# Patient Record
Sex: FEMALE | Race: WHITE | Employment: PART TIME | URBAN - METROPOLITAN AREA
[De-identification: names, ages, dates, MRNs, and addresses within clinical notes are randomized per-mention and may not be internally consistent; named-entity substitution may affect disease eponyms.]

---

## 2021-03-24 ENCOUNTER — HOSPITAL ENCOUNTER (EMERGENCY)
Age: 46
Discharge: HOME | End: 2021-03-25
Attending: EMERGENCY MEDICINE
Payer: MEDICAID

## 2021-03-24 DIAGNOSIS — R51.9 NONINTRACTABLE HEADACHE, UNSPECIFIED CHRONICITY PATTERN, UNSPECIFIED HEADACHE TYPE: Primary | ICD-10-CM

## 2021-03-24 DIAGNOSIS — J02.9 SORETHROAT: ICD-10-CM

## 2021-03-24 DIAGNOSIS — R63.5 WEIGHT GAIN: ICD-10-CM

## 2021-03-24 LAB
ALBUMIN SERPL-MCNC: 3.6 G/DL (ref 3.5–5)
ALBUMIN/GLOB SERPL: 1 {RATIO} (ref 1.1–2.2)
ALP SERPL-CCNC: 74 U/L (ref 45–117)
ALT SERPL-CCNC: 22 U/L (ref 12–78)
ANION GAP SERPL CALC-SCNC: 4 MMOL/L (ref 5–15)
AST SERPL-CCNC: 10 U/L (ref 15–37)
BASOPHILS # BLD: 0 K/UL (ref 0–0.1)
BASOPHILS NFR BLD: 0 % (ref 0–1)
BILIRUB SERPL-MCNC: 0.5 MG/DL (ref 0.2–1)
BUN SERPL-MCNC: 7 MG/DL (ref 6–20)
BUN/CREAT SERPL: 11 (ref 12–20)
CALCIUM SERPL-MCNC: 8.6 MG/DL (ref 8.5–10.1)
CHLORIDE SERPL-SCNC: 107 MMOL/L (ref 97–108)
CO2 SERPL-SCNC: 28 MMOL/L (ref 21–32)
COMMENT, HOLDF: NORMAL
CREAT SERPL-MCNC: 0.66 MG/DL (ref 0.55–1.02)
DIFFERENTIAL METHOD BLD: ABNORMAL
EOSINOPHIL # BLD: 0.1 K/UL (ref 0–0.4)
EOSINOPHIL NFR BLD: 2 % (ref 0–7)
ERYTHROCYTE [DISTWIDTH] IN BLOOD BY AUTOMATED COUNT: 15.2 % (ref 11.5–14.5)
GLOBULIN SER CALC-MCNC: 3.7 G/DL (ref 2–4)
GLUCOSE SERPL-MCNC: 83 MG/DL (ref 65–100)
HCT VFR BLD AUTO: 40 % (ref 35–47)
HGB BLD-MCNC: 13.1 G/DL (ref 11.5–16)
IMM GRANULOCYTES # BLD AUTO: 0 K/UL (ref 0–0.04)
IMM GRANULOCYTES NFR BLD AUTO: 0 % (ref 0–0.5)
LYMPHOCYTES # BLD: 2.4 K/UL (ref 0.8–3.5)
LYMPHOCYTES NFR BLD: 35 % (ref 12–49)
MCH RBC QN AUTO: 30.4 PG (ref 26–34)
MCHC RBC AUTO-ENTMCNC: 32.8 G/DL (ref 30–36.5)
MCV RBC AUTO: 92.8 FL (ref 80–99)
MONOCYTES # BLD: 0.6 K/UL (ref 0–1)
MONOCYTES NFR BLD: 8 % (ref 5–13)
NEUTS SEG # BLD: 3.7 K/UL (ref 1.8–8)
NEUTS SEG NFR BLD: 55 % (ref 32–75)
NRBC # BLD: 0 K/UL (ref 0–0.01)
NRBC BLD-RTO: 0 PER 100 WBC
PLATELET # BLD AUTO: 355 K/UL (ref 150–400)
PMV BLD AUTO: 9.6 FL (ref 8.9–12.9)
POTASSIUM SERPL-SCNC: 3.7 MMOL/L (ref 3.5–5.1)
PROT SERPL-MCNC: 7.3 G/DL (ref 6.4–8.2)
RBC # BLD AUTO: 4.31 M/UL (ref 3.8–5.2)
SAMPLES BEING HELD,HOLD: NORMAL
SODIUM SERPL-SCNC: 139 MMOL/L (ref 136–145)
TROPONIN I SERPL-MCNC: <0.05 NG/ML
WBC # BLD AUTO: 6.8 K/UL (ref 3.6–11)

## 2021-03-24 PROCEDURE — 36415 COLL VENOUS BLD VENIPUNCTURE: CPT

## 2021-03-24 PROCEDURE — 84439 ASSAY OF FREE THYROXINE: CPT

## 2021-03-24 PROCEDURE — 96375 TX/PRO/DX INJ NEW DRUG ADDON: CPT

## 2021-03-24 PROCEDURE — 84481 FREE ASSAY (FT-3): CPT

## 2021-03-24 PROCEDURE — 96374 THER/PROPH/DIAG INJ IV PUSH: CPT

## 2021-03-24 PROCEDURE — 80053 COMPREHEN METABOLIC PANEL: CPT

## 2021-03-24 PROCEDURE — 93005 ELECTROCARDIOGRAM TRACING: CPT

## 2021-03-24 PROCEDURE — 84443 ASSAY THYROID STIM HORMONE: CPT

## 2021-03-24 PROCEDURE — 84484 ASSAY OF TROPONIN QUANT: CPT

## 2021-03-24 PROCEDURE — 83880 ASSAY OF NATRIURETIC PEPTIDE: CPT

## 2021-03-24 PROCEDURE — 85025 COMPLETE CBC W/AUTO DIFF WBC: CPT

## 2021-03-24 PROCEDURE — 99285 EMERGENCY DEPT VISIT HI MDM: CPT

## 2021-03-24 RX ORDER — ONDANSETRON 2 MG/ML
8 INJECTION INTRAMUSCULAR; INTRAVENOUS
Status: COMPLETED | OUTPATIENT
Start: 2021-03-24 | End: 2021-03-25

## 2021-03-24 RX ORDER — KETOROLAC TROMETHAMINE 30 MG/ML
30 INJECTION, SOLUTION INTRAMUSCULAR; INTRAVENOUS
Status: COMPLETED | OUTPATIENT
Start: 2021-03-24 | End: 2021-03-25

## 2021-03-24 NOTE — ED TRIAGE NOTES
Triage:  Pt to the ED due to ongoing periods of SOB, lump in her throat, numbness/swelling to left side of face, and headaches.

## 2021-03-25 ENCOUNTER — APPOINTMENT (OUTPATIENT)
Dept: GENERAL RADIOLOGY | Age: 46
End: 2021-03-25
Attending: EMERGENCY MEDICINE
Payer: MEDICAID

## 2021-03-25 ENCOUNTER — APPOINTMENT (OUTPATIENT)
Dept: CT IMAGING | Age: 46
End: 2021-03-25
Attending: EMERGENCY MEDICINE
Payer: MEDICAID

## 2021-03-25 VITALS
HEIGHT: 70 IN | SYSTOLIC BLOOD PRESSURE: 125 MMHG | WEIGHT: 290 LBS | HEART RATE: 64 BPM | RESPIRATION RATE: 20 BRPM | OXYGEN SATURATION: 97 % | TEMPERATURE: 98 F | DIASTOLIC BLOOD PRESSURE: 88 MMHG | BODY MASS INDEX: 41.52 KG/M2

## 2021-03-25 LAB
ATRIAL RATE: 64 BPM
BNP SERPL-MCNC: 114 PG/ML
CALCULATED P AXIS, ECG09: 52 DEGREES
CALCULATED R AXIS, ECG10: 15 DEGREES
CALCULATED T AXIS, ECG11: 50 DEGREES
DIAGNOSIS, 93000: NORMAL
HCG UR QL: NEGATIVE
P-R INTERVAL, ECG05: 156 MS
Q-T INTERVAL, ECG07: 402 MS
QRS DURATION, ECG06: 70 MS
QTC CALCULATION (BEZET), ECG08: 414 MS
T3FREE SERPL-MCNC: 2.6 PG/ML (ref 2.2–4)
T4 FREE SERPL-MCNC: 0.8 NG/DL (ref 0.8–1.5)
TSH SERPL DL<=0.05 MIU/L-ACNC: 2.23 UIU/ML (ref 0.36–3.74)
VENTRICULAR RATE, ECG03: 64 BPM

## 2021-03-25 PROCEDURE — 70498 CT ANGIOGRAPHY NECK: CPT

## 2021-03-25 PROCEDURE — 81025 URINE PREGNANCY TEST: CPT

## 2021-03-25 PROCEDURE — 71045 X-RAY EXAM CHEST 1 VIEW: CPT

## 2021-03-25 PROCEDURE — 70450 CT HEAD/BRAIN W/O DYE: CPT

## 2021-03-25 PROCEDURE — 74011250636 HC RX REV CODE- 250/636: Performed by: EMERGENCY MEDICINE

## 2021-03-25 PROCEDURE — 74011000636 HC RX REV CODE- 636: Performed by: RADIOLOGY

## 2021-03-25 PROCEDURE — 70496 CT ANGIOGRAPHY HEAD: CPT

## 2021-03-25 RX ORDER — KETOROLAC TROMETHAMINE 30 MG/ML
INJECTION, SOLUTION INTRAMUSCULAR; INTRAVENOUS
Status: DISCONTINUED
Start: 2021-03-25 | End: 2021-03-25 | Stop reason: HOSPADM

## 2021-03-25 RX ORDER — ONDANSETRON 2 MG/ML
INJECTION INTRAMUSCULAR; INTRAVENOUS
Status: DISCONTINUED
Start: 2021-03-25 | End: 2021-03-25 | Stop reason: HOSPADM

## 2021-03-25 RX ADMIN — ONDANSETRON 8 MG: 2 INJECTION INTRAMUSCULAR; INTRAVENOUS at 00:04

## 2021-03-25 RX ADMIN — IOPAMIDOL 100 ML: 755 INJECTION, SOLUTION INTRAVENOUS at 02:17

## 2021-03-25 RX ADMIN — KETOROLAC TROMETHAMINE 30 MG: 30 INJECTION, SOLUTION INTRAMUSCULAR at 00:03

## 2021-03-25 NOTE — ED PROVIDER NOTES
HPI     8year-old female with a history of tubal ligation, migraines, presents the emergency department with several complaints. Patient's first complaint is headache. She states been ongoing for a few days. She states it started in the right occipital area and now is in the left frontal area. She states it is a pressure pain and if she squeezes her head and puts more pressure on it it helps. She has been taking over-the-counter Tylenol and Motrin but has not had anything today. She also complains of a lump in her throat for the past year. She states it burns and hurts to eat. She states she has gained 100 pounds in the past year but does not feel like she is eating enough to have caused the weight gain. She states she has no energy overall and feels lethargic. She states she is been having increasing shortness of breath on exertion. She denies a cough or chest pain. She reports nausea and photophobia with her headache that she rates as a 9 out of 10. She states her vision has gotten more blurry. There is no focal weakness or numbness. She states she does smoke she denies alcohol or drug abuse. She has not hit her head. She denies any fever. She is urinating normally. She does not have a primary care doctor so came here tonight to have all this checked out. She is concerned it could be her thyroid. She also states her father  of throat cancer. History reviewed. No pertinent past medical history. Past Surgical History:   Procedure Laterality Date    HX GYN      tubal ligation    HX OTHER SURGICAL      tonsillectomy         History reviewed. No pertinent family history.     Social History     Socioeconomic History    Marital status: SINGLE     Spouse name: Not on file    Number of children: Not on file    Years of education: Not on file    Highest education level: Not on file   Occupational History    Not on file   Social Needs    Financial resource strain: Not on file   [employer] insecurity     Worry: Not on file     Inability: Not on file    Transportation needs     Medical: Not on file     Non-medical: Not on file   Tobacco Use    Smoking status: Current Every Day Smoker     Packs/day: 1.00     Years: 15.00     Pack years: 15.00     Last attempt to quit: 2014     Years since quittin.3   Substance and Sexual Activity    Alcohol use: No    Drug use: No    Sexual activity: Not on file   Lifestyle    Physical activity     Days per week: Not on file     Minutes per session: Not on file    Stress: Not on file   Relationships    Social connections     Talks on phone: Not on file     Gets together: Not on file     Attends Religion service: Not on file     Active member of club or organization: Not on file     Attends meetings of clubs or organizations: Not on file     Relationship status: Not on file    Intimate partner violence     Fear of current or ex partner: Not on file     Emotionally abused: Not on file     Physically abused: Not on file     Forced sexual activity: Not on file   Other Topics Concern    Not on file   Social History Narrative    Not on file         ALLERGIES: Patient has no known allergies. Review of Systems   Constitutional: Negative for fever. HENT: Negative for congestion. Eyes: Positive for photophobia. Negative for visual disturbance. Respiratory: Positive for shortness of breath. Negative for chest tightness. Cardiovascular: Negative for chest pain, palpitations and leg swelling. Gastrointestinal: Positive for nausea. Negative for abdominal pain and vomiting. Endocrine: Negative for polyuria. Genitourinary: Negative for dysuria. Musculoskeletal: Negative for gait problem. Skin: Negative for rash. Psychiatric/Behavioral: Negative for dysphoric mood.        Vitals:    21 1847 21 2312 21 2316 21 2330   BP: (!) 214/154 (!) 156/75 (!) 150/78 (!) 140/90   Pulse: 86 78 64    Resp: 20 20 20    Temp: 97.9 °F (36.6 °C) 98 °F (36.7 °C)     SpO2: 97% 98% 99% 99%   Weight: 131.5 kg (290 lb)      Height: 5' 10\" (1.778 m)               Physical Exam  Constitutional:       General: She is not in acute distress. Appearance: She is well-developed. HENT:      Head: Normocephalic and atraumatic. Mouth/Throat:      Pharynx: No oropharyngeal exudate. Eyes:      General: No scleral icterus. Right eye: No discharge. Left eye: No discharge. Pupils: Pupils are equal, round, and reactive to light. Neck:      Musculoskeletal: Normal range of motion and neck supple. Vascular: No JVD. Cardiovascular:      Rate and Rhythm: Normal rate and regular rhythm. Heart sounds: Normal heart sounds. No murmur. Pulmonary:      Effort: Pulmonary effort is normal. No respiratory distress. Breath sounds: Normal breath sounds. No stridor. No wheezing or rales. Chest:      Chest wall: No tenderness. Abdominal:      General: Bowel sounds are normal. There is no distension. Palpations: Abdomen is soft. There is no mass. Tenderness: There is no abdominal tenderness. There is no guarding or rebound. Musculoskeletal: Normal range of motion. Skin:     General: Skin is warm and dry. Capillary Refill: Capillary refill takes less than 2 seconds. Findings: No rash. Neurological:      Mental Status: She is oriented to person, place, and time. Psychiatric:         Behavior: Behavior normal.         Thought Content: Thought content normal.         Judgment: Judgment normal.          MDM       Procedures      CT scan of head, CTA head neck negative. Labs are all reassuring including thyroid and cardiac enzymes. Patient's blood pressure is better and she is feeling much better. She will follow up with primary care once her insurance goes through. Return precautions provided       ED EKG interpretation:  Rhythm: normal sinus rhythm; and regular .  Rate (approx.): 64; Axis: normal; P wave: normal; QRS interval: normal ; ST/T wave: non-specific changes; This EKG was interpreted by Jon Rojas MD,ED Provider.

## 2021-03-25 NOTE — DISCHARGE INSTRUCTIONS
Work up in the emergency department was reassuring tonight including cat scan imaging of your brain and neck. No sign of thyroid disease. Liver, kidney and heart function is all normal.      It's important that you follow up with primary care once your insurance goes through to further evaluate your ongoing symptoms. Return to the Emergency room if you feel your symptoms/condition is worsening.

## 2021-05-29 ENCOUNTER — HOSPITAL ENCOUNTER (EMERGENCY)
Age: 46
Discharge: LEFT BEFORE DISCHARGE | End: 2021-05-29
Attending: EMERGENCY MEDICINE
Payer: COMMERCIAL

## 2021-05-29 VITALS
DIASTOLIC BLOOD PRESSURE: 84 MMHG | RESPIRATION RATE: 18 BRPM | SYSTOLIC BLOOD PRESSURE: 151 MMHG | HEART RATE: 111 BPM | OXYGEN SATURATION: 98 % | TEMPERATURE: 97.1 F

## 2021-05-29 DIAGNOSIS — R19.7 NAUSEA VOMITING AND DIARRHEA: Primary | ICD-10-CM

## 2021-05-29 DIAGNOSIS — R11.2 NAUSEA VOMITING AND DIARRHEA: Primary | ICD-10-CM

## 2021-05-29 LAB
APPEARANCE UR: CLEAR
BACTERIA URNS QL MICRO: NEGATIVE /HPF
BILIRUB UR QL: NEGATIVE
COLOR UR: ABNORMAL
EPITH CASTS URNS QL MICRO: ABNORMAL /LPF
GLUCOSE UR STRIP.AUTO-MCNC: NEGATIVE MG/DL
HGB UR QL STRIP: NEGATIVE
KETONES UR QL STRIP.AUTO: NEGATIVE MG/DL
LEUKOCYTE ESTERASE UR QL STRIP.AUTO: NEGATIVE
NITRITE UR QL STRIP.AUTO: NEGATIVE
PH UR STRIP: 6.5 [PH] (ref 5–8)
PROT UR STRIP-MCNC: ABNORMAL MG/DL
RBC #/AREA URNS HPF: ABNORMAL /HPF (ref 0–5)
SP GR UR REFRACTOMETRY: 1.01 (ref 1–1.03)
UR CULT HOLD, URHOLD: NORMAL
UROBILINOGEN UR QL STRIP.AUTO: 0.2 EU/DL (ref 0.2–1)
WBC URNS QL MICRO: ABNORMAL /HPF (ref 0–4)

## 2021-05-29 PROCEDURE — 99281 EMR DPT VST MAYX REQ PHY/QHP: CPT

## 2021-05-29 PROCEDURE — 81001 URINALYSIS AUTO W/SCOPE: CPT

## 2021-05-29 NOTE — ED TRIAGE NOTES
Pt arrives ambulatory to triage for c/o nausea and vomiting and diarrhea since Tuesday. Was seen in ED and told it was likely food poisoning. States she is still having watery stools.

## 2021-05-29 NOTE — ED PROVIDER NOTES
Date of Service:  (Not on file)    Patient:  Rupa Watkins    Chief Complaint:  Vomiting and Diarrhea       HPI:  Rupa Watkins is a 51 y.o.  female who presents for evaluation of nausea, vomiting, and diarrhea x 5 days. Initially went to [place], diagnosed with food poisioning and sent home with zofran and imodium. Improvement in nausea and vomiting but still has diarrhea. Diarrhea, is loose, watery, non bloody. Associated with abdominal cramping. No fever, chills, chest pain, sob, dysuria, numbness/tingling/weakness. No recent travel, well water, reptile exposure, no recent antibiotics. Ate: fish and chinese on Tuesday, stated one friend also had diarrhea x 1 day. PMH: none  Surgeries: tubal ligation, tonsillectomy  NKDA  1 pack cig/day, no ETOH, occ marijuana               No past medical history on file. Past Surgical History:   Procedure Laterality Date    HX GYN      tubal ligation    HX OTHER SURGICAL      tonsillectomy         No family history on file. Social History     Socioeconomic History    Marital status: SINGLE     Spouse name: Not on file    Number of children: Not on file    Years of education: Not on file    Highest education level: Not on file   Occupational History    Not on file   Tobacco Use    Smoking status: Current Every Day Smoker     Packs/day: 1.00     Years: 15.00     Pack years: 15.00     Last attempt to quit: 2014     Years since quittin.5   Substance and Sexual Activity    Alcohol use: No    Drug use: No    Sexual activity: Not on file   Other Topics Concern    Not on file   Social History Narrative    Not on file     Social Determinants of Health     Financial Resource Strain:     Difficulty of Paying Living Expenses:    Food Insecurity:     Worried About Running Out of Food in the Last Year:     [address] in the Last Year:    Transportation Needs:     Lack of Transportation (Medical):      Lack of Transportation (Non-Medical):    Physical Activity:     Days of Exercise per Week:     Minutes of Exercise per Session:    Stress:     Feeling of Stress :    Social Connections:     Frequency of Communication with Friends and Family:     Frequency of Social Gatherings with Friends and Family:     Attends Sabianist Services:     Active Member of Clubs or Organizations:     Attends Club or Organization Meetings:     Marital Status:    Intimate Partner Violence:     Fear of Current or Ex-Partner:     Emotionally Abused:     Physically Abused:     Sexually Abused: ALLERGIES: Patient has no known allergies. Review of Systems   Constitutional: Negative for chills and fever. HENT: Negative for trouble swallowing. Eyes: Negative for redness. Respiratory: Negative for shortness of breath. Cardiovascular: Negative for chest pain. Gastrointestinal: Positive for diarrhea, nausea and vomiting. Negative for abdominal pain. Genitourinary: Negative for dysuria. Musculoskeletal: Negative for gait problem. Skin: Negative for rash. Neurological: Negative for syncope. Vitals:    05/29/21 0924   BP: (!) 151/84   Pulse: (!) 111   Resp: 18   Temp: 97.1 °F (36.2 °C)   SpO2: 98%            Physical Exam  Vitals and nursing note reviewed. Constitutional:       Appearance: Normal appearance. HENT:      Head: Normocephalic and atraumatic. Nose: Nose normal.   Eyes:      Extraocular Movements: Extraocular movements intact. Conjunctiva/sclera: Conjunctivae normal.      Pupils: Pupils are equal, round, and reactive to light. Cardiovascular:      Rate and Rhythm: Normal rate and regular rhythm. Pulses: Normal pulses. Radial pulses are 2+ on the right side and 2+ on the left side. Posterior tibial pulses are 2+ on the right side and 2+ on the left side. Heart sounds: Normal heart sounds.    Pulmonary:      Effort: Pulmonary effort is normal.      Breath sounds: Normal breath sounds. Abdominal:      General: Abdomen is flat. Bowel sounds are normal. There is no distension. Palpations: Abdomen is soft. Tenderness: There is no abdominal tenderness. There is no right CVA tenderness, left CVA tenderness, guarding or rebound. Musculoskeletal:         General: Normal range of motion. Cervical back: Normal range of motion and neck supple. Skin:     General: Skin is warm and dry. Neurological:      General: No focal deficit present. Mental Status: She is alert and oriented to person, place, and time. Mental status is at baseline. Psychiatric:         Mood and Affect: Mood normal.         Behavior: Behavior normal.         Thought Content:  Thought content normal.          MDM  Number of Diagnoses or Management Options  Nausea vomiting and diarrhea  Diagnosis management comments: LABS:  Recent Results (from the past 6 hour(s))  -URINALYSIS W/MICROSCOPIC  Collection Time: 05/29/21 10:24 AM       Result                      Value             Ref Range           Color                       YELLOW/STRAW                          Appearance                  CLEAR             CLEAR               Specific gravity            1.009             1.003 - 1.03*       pH (UA)                     6.5               5.0 - 8.0           Protein                     TRACE (A)         NEG mg/dL           Glucose                     Negative          NEG mg/dL           Ketone                      Negative          NEG mg/dL           Bilirubin                   Negative          NEG                 Blood                       Negative          NEG                 Urobilinogen                0.2               0.2 - 1.0 EU*       Nitrites                    Negative          NEG                 Leukocyte Esterase          Negative          NEG                 WBC                         0-4               0 - 4 /hpf          RBC                         0-5               0 - 5 /hpf Epithelial cells            MODERATE (A)      FEW /lpf            Bacteria                    Negative          NEG /hpf       -URINE CULTURE HOLD SAMPLE  Collection Time: 05/29/21 10:24 AM  Specimen: Serum; Urine       Result                      Value             Ref Range           Urine culture hold                                            Urine on hold in Microbiology dept for 2 days. If unpreserved urine is submitted, it cannot be used for addtional testing after 24 hours, recollection will be required. DECISION MAKING:  Judy Day is a 51 y.o. female who comes in as above. Nausea, vomiting, non-bloody diarrhea x 5 days. No abdominal pain, no fever. No chest pain/sob. States she ate [place] food before this started, and one other person who ate the same also had diarrhea. No well water, recent antibiotics, reptile exposure. Patient resting comfortably, abdomen soft, nontender, no rebound/guarding. Bloodwork, UA, fluids, ordered. Patient eloped. DISPOSITION:  Eloped      There are no discharge medications for this patient. Follow-up Information    None         The patient is asked to follow-up with their primary care provider in the next several days. They are to call tomorrow for an appointment. The patient is asked to return promptly for any increased concerns or worsening of symptoms. They can return to this emergency department or any other emergency department.            Procedures

## 2023-03-29 ENCOUNTER — HOSPITAL ENCOUNTER (OUTPATIENT)
Dept: GENERAL RADIOLOGY | Age: 48
Discharge: HOME | End: 2023-03-29
Attending: FAMILY MEDICINE
Payer: COMMERCIAL

## 2023-03-29 ENCOUNTER — OFFICE VISIT (OUTPATIENT)
Dept: PRIMARY CARE CLINIC | Age: 48
End: 2023-03-29
Payer: COMMERCIAL

## 2023-03-29 VITALS
DIASTOLIC BLOOD PRESSURE: 85 MMHG | HEIGHT: 70 IN | HEART RATE: 63 BPM | SYSTOLIC BLOOD PRESSURE: 127 MMHG | OXYGEN SATURATION: 97 % | WEIGHT: 272 LBS | BODY MASS INDEX: 38.94 KG/M2 | RESPIRATION RATE: 18 BRPM | TEMPERATURE: 97.6 F

## 2023-03-29 DIAGNOSIS — G89.29 CHRONIC PAIN OF RIGHT KNEE: ICD-10-CM

## 2023-03-29 DIAGNOSIS — M54.50 CHRONIC RIGHT-SIDED LOW BACK PAIN WITHOUT SCIATICA: Primary | ICD-10-CM

## 2023-03-29 DIAGNOSIS — L73.2 HIDRADENITIS SUPPURATIVA: ICD-10-CM

## 2023-03-29 DIAGNOSIS — M25.561 CHRONIC PAIN OF RIGHT KNEE: ICD-10-CM

## 2023-03-29 DIAGNOSIS — Z13.1 SCREENING FOR DIABETES MELLITUS: ICD-10-CM

## 2023-03-29 DIAGNOSIS — M25.551 RIGHT HIP PAIN: ICD-10-CM

## 2023-03-29 DIAGNOSIS — Z13.220 ENCOUNTER FOR LIPID SCREENING FOR CARDIOVASCULAR DISEASE: ICD-10-CM

## 2023-03-29 DIAGNOSIS — Z13.6 ENCOUNTER FOR LIPID SCREENING FOR CARDIOVASCULAR DISEASE: ICD-10-CM

## 2023-03-29 DIAGNOSIS — M54.50 CHRONIC RIGHT-SIDED LOW BACK PAIN WITHOUT SCIATICA: ICD-10-CM

## 2023-03-29 DIAGNOSIS — Z11.59 NEED FOR HEPATITIS C SCREENING TEST: ICD-10-CM

## 2023-03-29 DIAGNOSIS — Z29.9 PREVENTIVE MEASURE: ICD-10-CM

## 2023-03-29 DIAGNOSIS — G89.29 CHRONIC RIGHT-SIDED LOW BACK PAIN WITHOUT SCIATICA: Primary | ICD-10-CM

## 2023-03-29 DIAGNOSIS — G89.29 CHRONIC RIGHT-SIDED LOW BACK PAIN WITHOUT SCIATICA: ICD-10-CM

## 2023-03-29 PROCEDURE — 73502 X-RAY EXAM HIP UNI 2-3 VIEWS: CPT

## 2023-03-29 PROCEDURE — 72100 X-RAY EXAM L-S SPINE 2/3 VWS: CPT

## 2023-03-29 PROCEDURE — 99204 OFFICE O/P NEW MOD 45 MIN: CPT | Performed by: FAMILY MEDICINE

## 2023-03-29 PROCEDURE — 73560 X-RAY EXAM OF KNEE 1 OR 2: CPT

## 2023-03-29 RX ORDER — CYCLOBENZAPRINE HCL 10 MG
10 TABLET ORAL
Qty: 5 TABLET | Refills: 0 | Status: SHIPPED | OUTPATIENT
Start: 2023-03-29

## 2023-03-29 RX ORDER — CLINDAMYCIN PHOSPHATE 10 UG/ML
LOTION TOPICAL 2 TIMES DAILY
Qty: 60 ML | Refills: 1 | Status: SHIPPED | OUTPATIENT
Start: 2023-03-29

## 2023-03-29 RX ORDER — ACETAMINOPHEN 500 MG
TABLET ORAL
COMMUNITY
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
## 2023-03-29 NOTE — PROGRESS NOTES
- Supportive care.  - Patient reports he did not tolerate Lyrica.  - [place]  reviewed.     Chief Complaint   Patient presents with    Establish Care    Hip Pain    Leg Pain        Identified pt with two pt identifiers(name and ). Chief Complaint   Patient presents with    Establish Care    Hip Pain    Leg Pain        No flowsheet data found. There were no vitals filed for this visit. Health Maintenance Due   Topic Date Due    Hepatitis C Screening  Never done    Depression Screen  Never done    COVID-19 Vaccine (1) Never done    Pneumococcal 0-64 years (1 - PCV) Never done    DTaP/Tdap/Td series (1 - Tdap) Never done    Cervical cancer screen  Never done    Lipid Screen  Never done    Colorectal Cancer Screening Combo  Never done    Flu Vaccine (1) Never done        1. Have you been to the ER, urgent care clinic since your last visit? Hospitalized since your last visit? No    2. Have you seen or consulted any other health care providers outside of the [address] [place] [address] since your last visit? Include any pap smears or colon screening. No    3. For patients aged 39-70: Has the patient had a colonoscopy / FIT/ Cologuard? No      If the patient is female:    4. For patients aged 41-77: Has the patient had a mammogram within the past 2 years? No      5. For patients aged 21-65: Has the patient had a pap smear?  No

## 2023-03-30 LAB
ALBUMIN SERPL-MCNC: 3.6 G/DL (ref 3.5–5)
ALBUMIN/GLOB SERPL: 1.1 (ref 1.1–2.2)
ALP SERPL-CCNC: 76 U/L (ref 45–117)
ALT SERPL-CCNC: 17 U/L (ref 12–78)
ANION GAP SERPL CALC-SCNC: 3 MMOL/L (ref 5–15)
AST SERPL-CCNC: 10 U/L (ref 15–37)
BASOPHILS # BLD: 0 K/UL (ref 0–0.1)
BASOPHILS NFR BLD: 1 % (ref 0–1)
BILIRUB SERPL-MCNC: 0.4 MG/DL (ref 0.2–1)
BUN SERPL-MCNC: 10 MG/DL (ref 6–20)
BUN/CREAT SERPL: 11 (ref 12–20)
CALCIUM SERPL-MCNC: 9 MG/DL (ref 8.5–10.1)
CHLORIDE SERPL-SCNC: 108 MMOL/L (ref 97–108)
CHOLEST SERPL-MCNC: 141 MG/DL
CO2 SERPL-SCNC: 28 MMOL/L (ref 21–32)
CREAT SERPL-MCNC: 0.88 MG/DL (ref 0.55–1.02)
DIFFERENTIAL METHOD BLD: ABNORMAL
EOSINOPHIL # BLD: 0.1 K/UL (ref 0–0.4)
EOSINOPHIL NFR BLD: 1 % (ref 0–7)
ERYTHROCYTE [DISTWIDTH] IN BLOOD BY AUTOMATED COUNT: 14.8 % (ref 11.5–14.5)
EST. AVERAGE GLUCOSE BLD GHB EST-MCNC: 97 MG/DL
GLOBULIN SER CALC-MCNC: 3.3 G/DL (ref 2–4)
GLUCOSE SERPL-MCNC: 78 MG/DL (ref 65–100)
HBA1C MFR BLD: 5 % (ref 4–5.6)
HCT VFR BLD AUTO: 42.8 % (ref 35–47)
HCV AB SERPL QL IA: NONREACTIVE
HDLC SERPL-MCNC: 57 MG/DL
HDLC SERPL: 2.5 (ref 0–5)
HGB BLD-MCNC: 13.3 G/DL (ref 11.5–16)
IMM GRANULOCYTES # BLD AUTO: 0 K/UL (ref 0–0.04)
IMM GRANULOCYTES NFR BLD AUTO: 0 % (ref 0–0.5)
LDLC SERPL CALC-MCNC: 66.8 MG/DL (ref 0–100)
LYMPHOCYTES # BLD: 2.5 K/UL (ref 0.8–3.5)
LYMPHOCYTES NFR BLD: 40 % (ref 12–49)
MCH RBC QN AUTO: 30.3 PG (ref 26–34)
MCHC RBC AUTO-ENTMCNC: 31.1 G/DL (ref 30–36.5)
MCV RBC AUTO: 97.5 FL (ref 80–99)
MONOCYTES # BLD: 0.5 K/UL (ref 0–1)
MONOCYTES NFR BLD: 8 % (ref 5–13)
NEUTS SEG # BLD: 3.2 K/UL (ref 1.8–8)
NEUTS SEG NFR BLD: 50 % (ref 32–75)
NRBC # BLD: 0 K/UL (ref 0–0.01)
NRBC BLD-RTO: 0 PER 100 WBC
PLATELET # BLD AUTO: 402 K/UL (ref 150–400)
PMV BLD AUTO: 10.2 FL (ref 8.9–12.9)
POTASSIUM SERPL-SCNC: 4.4 MMOL/L (ref 3.5–5.1)
PROT SERPL-MCNC: 6.9 G/DL (ref 6.4–8.2)
RBC # BLD AUTO: 4.39 M/UL (ref 3.8–5.2)
SODIUM SERPL-SCNC: 139 MMOL/L (ref 136–145)
TRIGL SERPL-MCNC: 86 MG/DL (ref ?–150)
VLDLC SERPL CALC-MCNC: 17.2 MG/DL
WBC # BLD AUTO: 6.3 K/UL (ref 3.6–11)

## 2023-04-06 ENCOUNTER — TELEPHONE (OUTPATIENT)
Dept: PRIMARY CARE CLINIC | Age: 48
End: 2023-04-06

## 2024-02-25 SDOH — ECONOMIC STABILITY: INCOME INSECURITY: HOW HARD IS IT FOR YOU TO PAY FOR THE VERY BASICS LIKE FOOD, HOUSING, MEDICAL CARE, AND HEATING?: VERY HARD

## 2024-02-25 SDOH — ECONOMIC STABILITY: FOOD INSECURITY: WITHIN THE PAST 12 MONTHS, YOU WORRIED THAT YOUR FOOD WOULD RUN OUT BEFORE YOU GOT MONEY TO BUY MORE.: OFTEN TRUE

## 2024-02-27 ENCOUNTER — OFFICE VISIT (OUTPATIENT)
Dept: PRIMARY CARE CLINIC | Facility: CLINIC | Age: 49
End: 2024-02-27
Payer: COMMERCIAL

## 2024-02-27 ENCOUNTER — HOSPITAL ENCOUNTER (OUTPATIENT)
Facility: HOSPITAL | Age: 49
Discharge: HOME | End: 2024-03-01
Attending: FAMILY MEDICINE
Payer: COMMERCIAL

## 2024-02-27 ENCOUNTER — ANCILLARY ORDERS (OUTPATIENT)
Dept: PRIMARY CARE CLINIC | Facility: CLINIC | Age: 49
End: 2024-02-27

## 2024-02-27 VITALS
HEIGHT: 70 IN | SYSTOLIC BLOOD PRESSURE: 105 MMHG | TEMPERATURE: 97.2 F | RESPIRATION RATE: 16 BRPM | HEART RATE: 70 BPM | WEIGHT: 290 LBS | BODY MASS INDEX: 41.52 KG/M2 | OXYGEN SATURATION: 96 % | DIASTOLIC BLOOD PRESSURE: 70 MMHG

## 2024-02-27 DIAGNOSIS — G89.29 CHRONIC PAIN OF LEFT KNEE: ICD-10-CM

## 2024-02-27 DIAGNOSIS — M79.605 PAIN OF LEFT LOWER EXTREMITY: Primary | ICD-10-CM

## 2024-02-27 DIAGNOSIS — M54.50 CHRONIC BILATERAL LOW BACK PAIN, UNSPECIFIED WHETHER SCIATICA PRESENT: ICD-10-CM

## 2024-02-27 DIAGNOSIS — M25.562 CHRONIC PAIN OF LEFT KNEE: ICD-10-CM

## 2024-02-27 DIAGNOSIS — G89.29 CHRONIC BILATERAL LOW BACK PAIN, UNSPECIFIED WHETHER SCIATICA PRESENT: ICD-10-CM

## 2024-02-27 PROCEDURE — 99214 OFFICE O/P EST MOD 30 MIN: CPT | Performed by: FAMILY MEDICINE

## 2024-02-27 PROCEDURE — 73562 X-RAY EXAM OF KNEE 3: CPT

## 2024-02-27 PROCEDURE — 72100 X-RAY EXAM L-S SPINE 2/3 VWS: CPT

## 2024-02-27 RX ORDER — ACETAMINOPHEN 325 MG/1
650 TABLET ORAL EVERY 6 HOURS PRN
COMMUNITY

## 2024-02-27 RX ORDER — MELOXICAM 15 MG/1
15 TABLET ORAL DAILY
Qty: 30 TABLET | Refills: 1 | Status: SHIPPED | OUTPATIENT
Start: 2024-02-27

## 2024-02-27 SDOH — ECONOMIC STABILITY: FOOD INSECURITY: WITHIN THE PAST 12 MONTHS, THE FOOD YOU BOUGHT JUST DIDN'T LAST AND YOU DIDN'T HAVE MONEY TO GET MORE.: NEVER TRUE

## 2024-02-27 SDOH — ECONOMIC STABILITY: HOUSING INSECURITY
IN THE LAST 12 MONTHS, WAS THERE A TIME WHEN YOU DID NOT HAVE A STEADY PLACE TO SLEEP OR SLEPT IN A SHELTER (INCLUDING NOW)?: NO

## 2024-02-27 SDOH — ECONOMIC STABILITY: INCOME INSECURITY: HOW HARD IS IT FOR YOU TO PAY FOR THE VERY BASICS LIKE FOOD, HOUSING, MEDICAL CARE, AND HEATING?: NOT HARD AT ALL

## 2024-02-27 SDOH — ECONOMIC STABILITY: FOOD INSECURITY: WITHIN THE PAST 12 MONTHS, YOU WORRIED THAT YOUR FOOD WOULD RUN OUT BEFORE YOU GOT MONEY TO BUY MORE.: NEVER TRUE

## 2024-02-27 ASSESSMENT — PATIENT HEALTH QUESTIONNAIRE - PHQ9
2. FEELING DOWN, DEPRESSED OR HOPELESS: 0
SUM OF ALL RESPONSES TO PHQ9 QUESTIONS 1 & 2: 0
SUM OF ALL RESPONSES TO PHQ QUESTIONS 1-9: 0
1. LITTLE INTEREST OR PLEASURE IN DOING THINGS: 0
SUM OF ALL RESPONSES TO PHQ QUESTIONS 1-9: 0
SUM OF ALL RESPONSES TO PHQ QUESTIONS 1-9: 0

## 2024-02-27 NOTE — PROGRESS NOTES
Subjective  Shanel Weber is an 49 y.o. female who presents for low back pain.     C/o low back pain, chronic.   Previously evaluated and started pt.  She had to stop PT due to loss of insurance coverage.    C/o chronic pain in left knee.   Pain limits her activities, especially walking.   Worse in the past 3 months.   She has been evaluated ortho.     Recent increase in left calf pain and swelling.     No recent injury.  Taking bc powder and tylenol, which does not help.       Allergies - reviewed:   No Known Allergies      Medications - reviewed:   Current Outpatient Medications   Medication Sig    acetaminophen (TYLENOL) 325 MG tablet Take 2 tablets by mouth every 6 hours as needed for Pain     No current facility-administered medications for this visit.         Past Medical History - reviewed:  History reviewed. No pertinent past medical history.      Past Surgical History - reviewed:   Past Surgical History:   Procedure Laterality Date    GYN      tubal ligation    OTHER SURGICAL HISTORY      tonsillectomy           ROS  CONSTITUTIONAL: Denies fever, chills, unintentional weight loss.  CARDIOVASCULAR: Denies chest pain, orthopnea, PND.  RESPIRATORY: Denies dyspnea.        Physical Exam  /70 (Site: Left Upper Arm, Position: Sitting, Cuff Size: Large Adult)   Pulse 70   Temp 97.2 °F (36.2 °C) (Temporal)   Resp 16   Ht 1.778 m (5' 10\")   Wt 131.5 kg (290 lb)   LMP 01/16/2024 (Approximate)   SpO2 96%   BMI 41.61 kg/m²   Physical Exam  Vitals reviewed.   Constitutional:       Appearance: Normal appearance.   HENT:      Head: Normocephalic and atraumatic.   Cardiovascular:      Rate and Rhythm: Normal rate and regular rhythm.      Pulses: Normal pulses.      Heart sounds: Normal heart sounds.   Pulmonary:      Effort: Pulmonary effort is normal.      Breath sounds: Normal breath sounds.   Musculoskeletal:      Right lower leg: No edema.      Left lower leg: Edema present.      Comments: Low back

## 2024-02-27 NOTE — PROGRESS NOTES
\"Have you been to the ER, urgent care clinic since your last visit?  Hospitalized since your last visit?\"    NO    “Have you seen or consulted any other health care providers outside of [facility] since your last visit?”    NO    “Have you had a colorectal cancer screening such as a colonoscopy/FIT/Cologuard?    NO     Have you had a mammogram?”   NO     “Have you had a pap smear?”    no

## 2024-03-01 ENCOUNTER — HOSPITAL ENCOUNTER (OUTPATIENT)
Facility: HOSPITAL | Age: 49
End: 2024-03-01
Attending: FAMILY MEDICINE
Payer: MEDICAID

## 2024-03-01 DIAGNOSIS — M79.605 PAIN OF LEFT LOWER EXTREMITY: ICD-10-CM

## 2024-03-01 PROCEDURE — 93971 EXTREMITY STUDY: CPT

## 2024-05-01 ENCOUNTER — COMMUNITY OUTREACH (OUTPATIENT)
Dept: PRIMARY CARE CLINIC | Facility: CLINIC | Age: 49
End: 2024-05-01

## 2024-10-08 ENCOUNTER — HOSPITAL ENCOUNTER (OUTPATIENT)
Facility: HOSPITAL | Age: 49
Discharge: HOME | End: 2024-10-11
Attending: FAMILY MEDICINE
Payer: COMMERCIAL

## 2024-10-08 ENCOUNTER — OFFICE VISIT (OUTPATIENT)
Dept: PRIMARY CARE CLINIC | Facility: CLINIC | Age: 49
End: 2024-10-08
Payer: COMMERCIAL

## 2024-10-08 VITALS
HEART RATE: 68 BPM | RESPIRATION RATE: 17 BRPM | OXYGEN SATURATION: 98 % | WEIGHT: 293 LBS | BODY MASS INDEX: 41.95 KG/M2 | TEMPERATURE: 97.3 F | DIASTOLIC BLOOD PRESSURE: 73 MMHG | SYSTOLIC BLOOD PRESSURE: 110 MMHG | HEIGHT: 70 IN

## 2024-10-08 DIAGNOSIS — R06.09 DOE (DYSPNEA ON EXERTION): ICD-10-CM

## 2024-10-08 DIAGNOSIS — R06.09 DOE (DYSPNEA ON EXERTION): Primary | ICD-10-CM

## 2024-10-08 DIAGNOSIS — M54.50 CHRONIC BILATERAL LOW BACK PAIN, UNSPECIFIED WHETHER SCIATICA PRESENT: ICD-10-CM

## 2024-10-08 DIAGNOSIS — Z72.0 TOBACCO USE: ICD-10-CM

## 2024-10-08 DIAGNOSIS — G89.29 CHRONIC BILATERAL LOW BACK PAIN, UNSPECIFIED WHETHER SCIATICA PRESENT: ICD-10-CM

## 2024-10-08 LAB
ALBUMIN SERPL-MCNC: 2.9 G/DL (ref 3.5–5)
ALBUMIN/GLOB SERPL: 0.7 (ref 1.1–2.2)
ALP SERPL-CCNC: 91 U/L (ref 45–117)
ALT SERPL-CCNC: 22 U/L (ref 12–78)
ANION GAP SERPL CALC-SCNC: 2 MMOL/L (ref 2–12)
AST SERPL-CCNC: 11 U/L (ref 15–37)
BILIRUB SERPL-MCNC: 0.3 MG/DL (ref 0.2–1)
BUN SERPL-MCNC: 14 MG/DL (ref 6–20)
BUN/CREAT SERPL: 16 (ref 12–20)
CALCIUM SERPL-MCNC: 9.1 MG/DL (ref 8.5–10.1)
CHLORIDE SERPL-SCNC: 111 MMOL/L (ref 97–108)
CO2 SERPL-SCNC: 26 MMOL/L (ref 21–32)
CREAT SERPL-MCNC: 0.87 MG/DL (ref 0.55–1.02)
ERYTHROCYTE [DISTWIDTH] IN BLOOD BY AUTOMATED COUNT: 15.2 % (ref 11.5–14.5)
GLOBULIN SER CALC-MCNC: 4.2 G/DL (ref 2–4)
GLUCOSE SERPL-MCNC: 103 MG/DL (ref 65–100)
HCT VFR BLD AUTO: 40.9 % (ref 35–47)
HGB BLD-MCNC: 13.2 G/DL (ref 11.5–16)
MCH RBC QN AUTO: 30.5 PG (ref 26–34)
MCHC RBC AUTO-ENTMCNC: 32.3 G/DL (ref 30–36.5)
MCV RBC AUTO: 94.5 FL (ref 80–99)
NRBC # BLD: 0 K/UL (ref 0–0.01)
NRBC BLD-RTO: 0 PER 100 WBC
PLATELET # BLD AUTO: 366 K/UL (ref 150–400)
PMV BLD AUTO: 9.8 FL (ref 8.9–12.9)
POTASSIUM SERPL-SCNC: 4.2 MMOL/L (ref 3.5–5.1)
PROT SERPL-MCNC: 7.1 G/DL (ref 6.4–8.2)
RBC # BLD AUTO: 4.33 M/UL (ref 3.8–5.2)
SODIUM SERPL-SCNC: 139 MMOL/L (ref 136–145)
TSH SERPL DL<=0.05 MIU/L-ACNC: 5.31 UIU/ML (ref 0.36–3.74)
WBC # BLD AUTO: 6.3 K/UL (ref 3.6–11)

## 2024-10-08 PROCEDURE — 99214 OFFICE O/P EST MOD 30 MIN: CPT | Performed by: FAMILY MEDICINE

## 2024-10-08 PROCEDURE — 93000 ELECTROCARDIOGRAM COMPLETE: CPT | Performed by: FAMILY MEDICINE

## 2024-10-08 PROCEDURE — 71046 X-RAY EXAM CHEST 2 VIEWS: CPT

## 2024-10-08 RX ORDER — VARENICLINE TARTRATE 0.5 (11)-1
KIT ORAL
Qty: 1 EACH | Refills: 0 | Status: SHIPPED | OUTPATIENT
Start: 2024-10-08 | End: 2024-10-09

## 2024-10-08 SDOH — ECONOMIC STABILITY: FOOD INSECURITY: WITHIN THE PAST 12 MONTHS, THE FOOD YOU BOUGHT JUST DIDN'T LAST AND YOU DIDN'T HAVE MONEY TO GET MORE.: NEVER TRUE

## 2024-10-08 SDOH — ECONOMIC STABILITY: FOOD INSECURITY: WITHIN THE PAST 12 MONTHS, YOU WORRIED THAT YOUR FOOD WOULD RUN OUT BEFORE YOU GOT MONEY TO BUY MORE.: NEVER TRUE

## 2024-10-08 SDOH — ECONOMIC STABILITY: INCOME INSECURITY: HOW HARD IS IT FOR YOU TO PAY FOR THE VERY BASICS LIKE FOOD, HOUSING, MEDICAL CARE, AND HEATING?: NOT HARD AT ALL

## 2024-10-08 ASSESSMENT — PATIENT HEALTH QUESTIONNAIRE - PHQ9
SUM OF ALL RESPONSES TO PHQ QUESTIONS 1-9: 0
1. LITTLE INTEREST OR PLEASURE IN DOING THINGS: NOT AT ALL
SUM OF ALL RESPONSES TO PHQ QUESTIONS 1-9: 0
2. FEELING DOWN, DEPRESSED OR HOPELESS: NOT AT ALL
SUM OF ALL RESPONSES TO PHQ QUESTIONS 1-9: 0
SUM OF ALL RESPONSES TO PHQ9 QUESTIONS 1 & 2: 0
SUM OF ALL RESPONSES TO PHQ QUESTIONS 1-9: 0

## 2024-10-08 NOTE — PROGRESS NOTES
Subjective  Shanel Weber is an 49 y.o. female who presents for multiple complaints.    C/o chronic dyspnea on exertion. This has been worsening for about 6 months.  Chronic cough, productive of sputum.   No weight loss or fevers.   Orthopnea. Denies chest pains.   Smokes > 1 ppd since 18 yo.     C/o chronic back pain. Increased severity in recent months.  No new incontinence. No paresthesias or focal numbness.       Allergies - reviewed:   No Known Allergies      Medications - reviewed:   Current Outpatient Medications   Medication Sig    acetaminophen (TYLENOL) 325 MG tablet Take 2 tablets by mouth every 6 hours as needed for Pain    meloxicam (MOBIC) 15 MG tablet Take 1 tablet by mouth daily     No current facility-administered medications for this visit.         ROS  CONSTITUTIONAL: Denies fever, chills, unintentional weight loss.  CARDIOVASCULAR: Denies chest pain.  GI: Denies abdominal pain, diarrhea, constipation, black or bloody stool.         Physical Exam  /73 (Site: Left Upper Arm, Position: Sitting)   Pulse 68   Temp 97.3 °F (36.3 °C) (Temporal)   Resp 17   Ht 1.778 m (5' 10\")   Wt 135.2 kg (298 lb)   SpO2 98%   BMI 42.76 kg/m²   Physical Exam  Vitals reviewed.   Constitutional:       Appearance: Normal appearance.   HENT:      Head: Normocephalic and atraumatic.   Cardiovascular:      Rate and Rhythm: Normal rate and regular rhythm.      Heart sounds: Normal heart sounds.   Pulmonary:      Effort: Pulmonary effort is normal.      Breath sounds: Normal breath sounds.   Musculoskeletal:      Right lower leg: No edema.      Left lower leg: No edema.      Comments: No calf tenderness. Homans sign negative.    Skin:     General: Skin is warm and dry.   Neurological:      Mental Status: She is alert.       EKG reviewed.  Normal sinus rhythm.  No acute ischemic changes.    Assessment/Plan   Diagnosis Orders   1. WEINER (dyspnea on exertion)  XR CHEST (2 VIEWS)    CBC    Comprehensive Metabolic

## 2024-10-08 NOTE — PROGRESS NOTES
Health Decision Maker has been checked with the patient          Chief Complaint   Patient presents with    Pain     Back and hips have ketty getting worse    Shortness of Breath     Has gotten worse.       \"Have you been to the ER, urgent care clinic since your last visit?  Hospitalized since your last visit?\"    NO    “Have you seen or consulted any other health care providers outside of [facility] since your last visit?”    NO           Have you had a mammogram?”   NO        “Have you had a pap smear?”    It's been years. It's been 28 years.         “Have you had a colorectal cancer screening such as a colonoscopy/FIT/Cologuard?      NO         Click Here for Release of Records Request

## 2024-10-09 ENCOUNTER — OFFICE VISIT (OUTPATIENT)
Age: 49
End: 2024-10-09
Payer: COMMERCIAL

## 2024-10-09 VITALS
OXYGEN SATURATION: 98 % | WEIGHT: 293 LBS | DIASTOLIC BLOOD PRESSURE: 88 MMHG | BODY MASS INDEX: 41.95 KG/M2 | SYSTOLIC BLOOD PRESSURE: 128 MMHG | HEIGHT: 70 IN | HEART RATE: 113 BPM

## 2024-10-09 DIAGNOSIS — I20.0 UNSTABLE ANGINA (HCC): Primary | ICD-10-CM

## 2024-10-09 DIAGNOSIS — G89.29 CHRONIC BACK PAIN, UNSPECIFIED BACK LOCATION, UNSPECIFIED BACK PAIN LATERALITY: ICD-10-CM

## 2024-10-09 DIAGNOSIS — M54.9 CHRONIC BACK PAIN, UNSPECIFIED BACK LOCATION, UNSPECIFIED BACK PAIN LATERALITY: ICD-10-CM

## 2024-10-09 DIAGNOSIS — R06.02 SHORT OF BREATH ON EXERTION: ICD-10-CM

## 2024-10-09 DIAGNOSIS — Z72.0 TOBACCO USE: ICD-10-CM

## 2024-10-09 PROCEDURE — 99204 OFFICE O/P NEW MOD 45 MIN: CPT | Performed by: INTERNAL MEDICINE

## 2024-10-09 ASSESSMENT — PATIENT HEALTH QUESTIONNAIRE - PHQ9
SUM OF ALL RESPONSES TO PHQ QUESTIONS 1-9: 0
SUM OF ALL RESPONSES TO PHQ QUESTIONS 1-9: 0
1. LITTLE INTEREST OR PLEASURE IN DOING THINGS: NOT AT ALL
SUM OF ALL RESPONSES TO PHQ QUESTIONS 1-9: 0
SUM OF ALL RESPONSES TO PHQ QUESTIONS 1-9: 0
2. FEELING DOWN, DEPRESSED OR HOPELESS: NOT AT ALL
SUM OF ALL RESPONSES TO PHQ9 QUESTIONS 1 & 2: 0

## 2024-10-09 NOTE — PROGRESS NOTES
[facility] [place]: [place]  [phone]    History of Present Illness:  Ms. Weber is a 50 yo F with a history of tobacco use, chronic back pain, gastroesophageal reflux, referred by her primary care for cardiac evaluation.  She notes over the last six months she has had progressive shortness of breath with exertion, now she can only go a few steps and she will get very restricted with her breathing. She denies any marck chest pain.  She does have occasional palpitations.  Denies any prior cardiac history. She is compensated on exam with clear lungs and no lower extremity edema.  Soc hx. 1-2 ppd tobacco.   Fam hx. Dad 60 yo passed with throat CA. No early CAD  Assessment/Plan:  1. Unstable angina.  Exertional shortness of breath, possible anginal equivalent. Will proceed with an echo and stress test for further evaluation. She has severe issues with her back with chronic back pain, which limits her activity and it is difficult for her to ambulate and she cannot do a treadmill and will do this Lexiscan.  2. Tobacco use.  She was given something to help with cessation by her primary care.  She is also scheduling to see pulmonary.  There is a high chance that given her long smoking history this is at least partly pulmonary in etiology.      She  has a past medical history of Headache and Osteoarthritis.    All other systems negative except as above.     PE  Vitals:    10/09/24 1000   BP: 128/88   Site: Left Upper Arm   Position: Sitting   Cuff Size: Large Adult   Pulse: (!) 113   SpO2: 98%   Weight: 134.7 kg (297 lb)   Height: 1.778 m (5' 10\")    Body mass index is 42.62 kg/m².  General appearance - alert, well appearing, and in no distress  Mental status - affect appropriate to mood  Eyes - sclera anicteric, moist mucous membranes  Neck - supple, no JVD  Chest - clear to auscultation, no wheezes, rales or rhonchi  Heart - normal rate, regular rhythm, normal S1, S2, no murmurs, rubs, clicks or gallops  Abdomen -

## 2024-10-09 NOTE — PATIENT INSTRUCTIONS
You will be scheduled for a Nuclear Stress Test after your appointment today.    Nuclear stress testing evaluates blood flow to your heart muscle and assesses cardiac function. There are 2 parts (Rest/Stress) to this procedure and will include either an exercise on a treadmill or an IV administration of a stressing medication called Lexiscan. Your cardiologist will determine which type of testing is best for you. This test can be performed in one day unless it is determined that better quality images will be obtained by performing the test over two days.     *Please arrive 15 minutes prior to your appointment time    Test Duration:    -Two day testing will take 2 hours each day. Your second day(resting images) will be scheduled after the first day is completed    Day of testing instructions:    NO CAFFEINE (not even decaffeinated products) 24 HOURS PRIOR TO TESTING. This includes coffee, soda, tea, chocolate, multivitamins, and migraine medication, like Excedrin or Fioricet that contains caffeine.  Nothing to eat or drink 4 HOURS prior to testing. You pay have small sips of water.   NO NICOTINE 12 hours prior to testing  Do not hold medications.   Wear comfortable clothes and shoes (Shirts with no metal, shorts or pants, tennis shoes, no heels or flip flops)    IMPORTANT: This testing involves a cardiac tracer ordered specifically for you. If you are unable to make your appointment, please call to cancel/reschedule AT LEAST 24 hours prior to your appointment so your tracer can be cancelled. [phone].

## 2024-10-11 ENCOUNTER — TELEPHONE (OUTPATIENT)
Dept: PRIMARY CARE CLINIC | Facility: CLINIC | Age: 49
End: 2024-10-11

## 2024-10-11 DIAGNOSIS — Z72.0 TOBACCO USE: Primary | ICD-10-CM

## 2024-10-11 RX ORDER — VARENICLINE TARTRATE 0.5 MG/1
.5-1 TABLET, FILM COATED ORAL SEE ADMIN INSTRUCTIONS
Qty: 57 TABLET | Refills: 0 | Status: SHIPPED | OUTPATIENT
Start: 2024-10-11

## 2024-10-11 RX ORDER — ALBUTEROL SULFATE 90 UG/1
2 INHALANT RESPIRATORY (INHALATION) EVERY 6 HOURS PRN
Qty: 18 G | Refills: 3 | Status: SHIPPED | OUTPATIENT
Start: 2024-10-11

## 2024-10-11 NOTE — TELEPHONE ENCOUNTER
Pt daughter called because she said Dr. Hernandez was supposed to send over a prescription for an inhaler and another prescription for something that will help her stop smoking. Pt daughter stated that [facility] has not received any prescriptions from us. Please call the daughter back once orders are filled at [phone]

## 2024-10-25 ENCOUNTER — TELEPHONE (OUTPATIENT)
Dept: PRIMARY CARE CLINIC | Facility: CLINIC | Age: 49
End: 2024-10-25

## 2024-10-25 NOTE — TELEPHONE ENCOUNTER
Spoke with patient. She said that Dr. Hernandez told her to follow up with PCP in a few months to go over lab results. Thyroid levels slightly abnormal. First available is 12/09. Patient said she would like to take that appointment.  She has been scheduled.

## 2024-10-25 NOTE — TELEPHONE ENCOUNTER
----- Message from Vaishali COLE sent at 10/24/2024 11:24 AM EDT -----  Regarding: [facility] Appointment Request  [facility] Appointment Request    Patient needs appointment for [facility] Appointment Type: Existing Condition Follow Up./ f/up for lab result     Patient Requested Dates(s):any availability   Patient Requested Time:any time   Provider Name:Nunu Marks DO, Burton, Rachael, DO      Reason for Appointment Request: Established Patient - Available appointments did not meet patient need  --------------------------------------------------------------------------------------------------------------------------    Relationship to Patient: Guardian/ daughter      Call Back Information: OK to leave message on voicemail  Preferred Call Back Number: Phone [phone]

## 2024-11-05 ENCOUNTER — HOSPITAL ENCOUNTER (OUTPATIENT)
Facility: HOSPITAL | Age: 49
Discharge: HOME | End: 2024-11-08
Attending: INTERNAL MEDICINE
Payer: COMMERCIAL

## 2024-11-05 DIAGNOSIS — F17.200 CURRENT SMOKER: ICD-10-CM

## 2024-11-05 DIAGNOSIS — Z87.891 PERSONAL HISTORY OF TOBACCO USE: ICD-10-CM

## 2024-11-05 PROCEDURE — 71271 CT THORAX LUNG CANCER SCR C-: CPT

## 2024-11-15 ENCOUNTER — ANCILLARY PROCEDURE (OUTPATIENT)
Age: 49
End: 2024-11-15
Payer: COMMERCIAL

## 2024-11-15 ENCOUNTER — TELEPHONE (OUTPATIENT)
Age: 49
End: 2024-11-15

## 2024-11-15 VITALS
SYSTOLIC BLOOD PRESSURE: 102 MMHG | HEIGHT: 70 IN | HEART RATE: 71 BPM | WEIGHT: 293 LBS | DIASTOLIC BLOOD PRESSURE: 64 MMHG | BODY MASS INDEX: 41.95 KG/M2

## 2024-11-15 VITALS
WEIGHT: 293 LBS | DIASTOLIC BLOOD PRESSURE: 64 MMHG | BODY MASS INDEX: 41.95 KG/M2 | SYSTOLIC BLOOD PRESSURE: 102 MMHG | HEIGHT: 70 IN

## 2024-11-15 DIAGNOSIS — I20.0 UNSTABLE ANGINA (HCC): ICD-10-CM

## 2024-11-15 DIAGNOSIS — M54.9 CHRONIC BACK PAIN, UNSPECIFIED BACK LOCATION, UNSPECIFIED BACK PAIN LATERALITY: ICD-10-CM

## 2024-11-15 DIAGNOSIS — R06.02 SHORT OF BREATH ON EXERTION: ICD-10-CM

## 2024-11-15 DIAGNOSIS — Z72.0 TOBACCO USE: ICD-10-CM

## 2024-11-15 DIAGNOSIS — G89.29 CHRONIC BACK PAIN, UNSPECIFIED BACK LOCATION, UNSPECIFIED BACK PAIN LATERALITY: ICD-10-CM

## 2024-11-15 LAB
ECHO AO ASC DIAM: 3 CM
ECHO AO ASCENDING AORTA INDEX: 1.21 CM/M2
ECHO AO ROOT DIAM: 3 CM
ECHO AO ROOT INDEX: 1.21 CM/M2
ECHO AV MEAN GRADIENT: 4 MMHG
ECHO AV MEAN VELOCITY: 0.9 M/S
ECHO AV PEAK GRADIENT: 8 MMHG
ECHO AV PEAK VELOCITY: 1.4 M/S
ECHO AV VELOCITY RATIO: 0.64
ECHO AV VTI: 23.3 CM
ECHO BSA: 2.58 M2
ECHO BSA: 2.58 M2
ECHO LA DIAMETER INDEX: 1.58 CM/M2
ECHO LA DIAMETER: 3.9 CM
ECHO LA TO AORTIC ROOT RATIO: 1.3
ECHO LA VOL A-L A2C: 61 ML (ref 22–52)
ECHO LA VOL A-L A4C: 53 ML (ref 22–52)
ECHO LA VOL BP: 57 ML (ref 22–52)
ECHO LA VOL MOD A2C: 58 ML (ref 22–52)
ECHO LA VOL MOD A4C: 49 ML (ref 22–52)
ECHO LA VOL/BSA BIPLANE: 23 ML/M2 (ref 16–34)
ECHO LA VOLUME AREA LENGTH: 60 ML
ECHO LA VOLUME INDEX A-L A2C: 25 ML/M2 (ref 16–34)
ECHO LA VOLUME INDEX A-L A4C: 21 ML/M2 (ref 16–34)
ECHO LA VOLUME INDEX AREA LENGTH: 24 ML/M2 (ref 16–34)
ECHO LA VOLUME INDEX MOD A2C: 23 ML/M2 (ref 16–34)
ECHO LA VOLUME INDEX MOD A4C: 20 ML/M2 (ref 16–34)
ECHO LV E' LATERAL VELOCITY: 7.84 CM/S
ECHO LV E' SEPTAL VELOCITY: 7.84 CM/S
ECHO LV EDV A2C: 102 ML
ECHO LV EDV A4C: 112 ML
ECHO LV EDV BP: 108 ML (ref 56–104)
ECHO LV EDV INDEX A4C: 45 ML/M2
ECHO LV EDV INDEX BP: 44 ML/M2
ECHO LV EDV NDEX A2C: 41 ML/M2
ECHO LV EJECTION FRACTION A2C: 52 %
ECHO LV EJECTION FRACTION A4C: 55 %
ECHO LV EJECTION FRACTION BIPLANE: 54 % (ref 55–100)
ECHO LV ESV A2C: 49 ML
ECHO LV ESV A4C: 50 ML
ECHO LV ESV BP: 50 ML (ref 19–49)
ECHO LV ESV INDEX A2C: 20 ML/M2
ECHO LV ESV INDEX A4C: 20 ML/M2
ECHO LV ESV INDEX BP: 20 ML/M2
ECHO LV FRACTIONAL SHORTENING: 30 % (ref 28–44)
ECHO LV INTERNAL DIMENSION DIASTOLE INDEX: 1.78 CM/M2
ECHO LV INTERNAL DIMENSION DIASTOLIC: 4.4 CM (ref 3.9–5.3)
ECHO LV INTERNAL DIMENSION SYSTOLIC INDEX: 1.26 CM/M2
ECHO LV INTERNAL DIMENSION SYSTOLIC: 3.1 CM
ECHO LV IVSD: 1.4 CM (ref 0.6–0.9)
ECHO LV MASS 2D: 180 G (ref 67–162)
ECHO LV MASS INDEX 2D: 72.9 G/M2 (ref 43–95)
ECHO LV POSTERIOR WALL DIASTOLIC: 0.9 CM (ref 0.6–0.9)
ECHO LV RELATIVE WALL THICKNESS RATIO: 0.41
ECHO LVOT AV VTI INDEX: 0.72
ECHO LVOT MEAN GRADIENT: 2 MMHG
ECHO LVOT PEAK GRADIENT: 3 MMHG
ECHO LVOT PEAK VELOCITY: 0.9 M/S
ECHO LVOT VTI: 16.7 CM
ECHO MV A VELOCITY: 0.52 M/S
ECHO MV AREA PHT: 3.3 CM2
ECHO MV E DECELERATION TIME (DT): 228.7 MS
ECHO MV E VELOCITY: 0.64 M/S
ECHO MV E/A RATIO: 1.23
ECHO MV E/E' LATERAL: 8.16
ECHO MV E/E' RATIO (AVERAGED): 8.16
ECHO MV E/E' SEPTAL: 8.16
ECHO MV PRESSURE HALF TIME (PHT): 66.3 MS
ECHO RA AREA 4C: 17.9 CM2
ECHO RA END SYSTOLIC VOLUME APICAL 4 CHAMBER INDEX BSA: 21 ML/M2
ECHO RA VOLUME AREA LENGTH APICAL 4 CHAMBER: 56 ML
ECHO RA VOLUME: 51 ML
ECHO RV FREE WALL PEAK S': 11.9 CM/S
ECHO RV INTERNAL DIMENSION: 3.6 CM
ECHO RV TAPSE: 2.3 CM (ref 1.7–?)
STRESS BASELINE DIAS BP: 64 MMHG
STRESS BASELINE HR: 62 BPM
STRESS BASELINE SYS BP: 102 MMHG
STRESS O2 SAT PEAK: 98 %
STRESS O2 SAT REST: 99 %
STRESS PEAK DIAS BP: 82 MMHG
STRESS PEAK SYS BP: 120 MMHG
STRESS PERCENT HR ACHIEVED: 71 %
STRESS POST PEAK HR: 120 BPM
STRESS RATE PRESSURE PRODUCT: NORMAL BPM*MMHG
STRESS TARGET HR: 170 BPM

## 2024-11-15 PROCEDURE — 93306 TTE W/DOPPLER COMPLETE: CPT | Performed by: INTERNAL MEDICINE

## 2024-11-15 RX ORDER — TETRAKIS(2-METHOXYISOBUTYLISOCYANIDE)COPPER(I) TETRAFLUOROBORATE 1 MG/ML
26.1 INJECTION, POWDER, LYOPHILIZED, FOR SOLUTION INTRAVENOUS
Status: COMPLETED | OUTPATIENT
Start: 2024-11-15 | End: 2024-11-15

## 2024-11-15 RX ORDER — AMINOPHYLLINE 25 MG/ML
100 INJECTION, SOLUTION INTRAVENOUS ONCE
Status: COMPLETED | OUTPATIENT
Start: 2024-11-15 | End: 2024-11-15

## 2024-11-15 RX ORDER — REGADENOSON 0.08 MG/ML
0.4 INJECTION, SOLUTION INTRAVENOUS
Status: COMPLETED | OUTPATIENT
Start: 2024-11-15 | End: 2024-11-15

## 2024-11-15 RX ADMIN — REGADENOSON 0.4 MG: 0.08 INJECTION, SOLUTION INTRAVENOUS at 08:35

## 2024-11-15 RX ADMIN — AMINOPHYLLINE 100 MG: 25 INJECTION, SOLUTION INTRAVENOUS at 08:40

## 2024-11-15 RX ADMIN — TETRAKIS(2-METHOXYISOBUTYLISOCYANIDE)COPPER(I) TETRAFLUOROBORATE 26.1 MILLICURIE: 1 INJECTION, POWDER, LYOPHILIZED, FOR SOLUTION INTRAVENOUS at 08:35

## 2024-11-15 NOTE — TELEPHONE ENCOUNTER
----- Message from Dr. Tirso Rios MD sent at 11/15/2024 11:32 AM EST -----  Please let pt know echo was overall normal. thx

## 2024-11-18 LAB
ECHO BSA: 2.58 M2
NUC STRESS EJECTION FRACTION: 56 %
STRESS BASELINE DIAS BP: 64 MMHG
STRESS BASELINE HR: 62 BPM
STRESS BASELINE ST DEPRESSION: 0 MM
STRESS BASELINE SYS BP: 102 MMHG
STRESS O2 SAT PEAK: 98 %
STRESS O2 SAT REST: 99 %
STRESS PEAK DIAS BP: 82 MMHG
STRESS PEAK SYS BP: 120 MMHG
STRESS PERCENT HR ACHIEVED: 71 %
STRESS POST PEAK HR: 120 BPM
STRESS RATE PRESSURE PRODUCT: NORMAL BPM*MMHG
STRESS ST DEPRESSION: 0 MM
STRESS TARGET HR: 170 BPM
TID: 1.14

## 2024-11-19 ENCOUNTER — TELEPHONE (OUTPATIENT)
Age: 49
End: 2024-11-19

## 2024-11-19 NOTE — TELEPHONE ENCOUNTER
----- Message from Dr. Tirso Rios MD sent at 11/18/2024  5:32 PM EST -----  See if pt can come in within next week to discuss results of abnormal stress test. thx

## 2024-11-19 NOTE — TELEPHONE ENCOUNTER
Telephone call made to patient. Two patient identifiers verified.   Went over results with patient. Verified understanding. All questions answered.     Future Appointments   Date Time Provider Department Center   11/25/2024  8:00 AM Tirso Rios MD [facility]   12/9/2024  8:00 AM Nunu Marks DO [facility] DEP

## 2024-11-20 ENCOUNTER — TELEPHONE (OUTPATIENT)
Age: 49
End: 2024-11-20

## 2024-11-20 ENCOUNTER — PREP FOR PROCEDURE (OUTPATIENT)
Age: 49
End: 2024-11-20

## 2024-11-20 ENCOUNTER — OFFICE VISIT (OUTPATIENT)
Age: 49
End: 2024-11-20
Payer: COMMERCIAL

## 2024-11-20 VITALS
HEIGHT: 70 IN | OXYGEN SATURATION: 97 % | WEIGHT: 293 LBS | DIASTOLIC BLOOD PRESSURE: 80 MMHG | HEART RATE: 73 BPM | BODY MASS INDEX: 41.95 KG/M2 | SYSTOLIC BLOOD PRESSURE: 115 MMHG

## 2024-11-20 DIAGNOSIS — R94.39 ABNORMAL STRESS TEST: ICD-10-CM

## 2024-11-20 DIAGNOSIS — R94.39 ABNORMAL STRESS TEST: Primary | ICD-10-CM

## 2024-11-20 DIAGNOSIS — Z87.891 HISTORY OF TOBACCO USE: ICD-10-CM

## 2024-11-20 DIAGNOSIS — I20.0 UNSTABLE ANGINA (HCC): ICD-10-CM

## 2024-11-20 DIAGNOSIS — I20.0 UNSTABLE ANGINA (HCC): Primary | ICD-10-CM

## 2024-11-20 LAB
ANION GAP SERPL CALC-SCNC: 5 MMOL/L (ref 2–12)
BUN SERPL-MCNC: 10 MG/DL (ref 6–20)
BUN/CREAT SERPL: 12 (ref 12–20)
CALCIUM SERPL-MCNC: 9.4 MG/DL (ref 8.5–10.1)
CHLORIDE SERPL-SCNC: 110 MMOL/L (ref 97–108)
CO2 SERPL-SCNC: 27 MMOL/L (ref 21–32)
CREAT SERPL-MCNC: 0.82 MG/DL (ref 0.55–1.02)
ERYTHROCYTE [DISTWIDTH] IN BLOOD BY AUTOMATED COUNT: 14.8 % (ref 11.5–14.5)
GLUCOSE SERPL-MCNC: 89 MG/DL (ref 65–100)
HCT VFR BLD AUTO: 39.9 % (ref 35–47)
HGB BLD-MCNC: 13.1 G/DL (ref 11.5–16)
MCH RBC QN AUTO: 31.2 PG (ref 26–34)
MCHC RBC AUTO-ENTMCNC: 32.8 G/DL (ref 30–36.5)
MCV RBC AUTO: 95 FL (ref 80–99)
NRBC # BLD: 0 K/UL (ref 0–0.01)
NRBC BLD-RTO: 0 PER 100 WBC
PLATELET # BLD AUTO: 348 K/UL (ref 150–400)
PMV BLD AUTO: 10.5 FL (ref 8.9–12.9)
POTASSIUM SERPL-SCNC: 4.6 MMOL/L (ref 3.5–5.1)
RBC # BLD AUTO: 4.2 M/UL (ref 3.8–5.2)
SODIUM SERPL-SCNC: 142 MMOL/L (ref 136–145)
WBC # BLD AUTO: 6.4 K/UL (ref 3.6–11)

## 2024-11-20 PROCEDURE — 99214 OFFICE O/P EST MOD 30 MIN: CPT | Performed by: INTERNAL MEDICINE

## 2024-11-20 RX ORDER — ASPIRIN 81 MG/1
81 TABLET ORAL DAILY
Qty: 90 TABLET | Refills: 3 | Status: SHIPPED | OUTPATIENT
Start: 2024-11-20

## 2024-11-20 RX ORDER — METOPROLOL SUCCINATE 25 MG/1
12.5 TABLET, EXTENDED RELEASE ORAL DAILY
Qty: 45 TABLET | Refills: 1 | Status: SHIPPED | OUTPATIENT
Start: 2024-11-20

## 2024-11-20 ASSESSMENT — PATIENT HEALTH QUESTIONNAIRE - PHQ9
SUM OF ALL RESPONSES TO PHQ QUESTIONS 1-9: 0
1. LITTLE INTEREST OR PLEASURE IN DOING THINGS: NOT AT ALL
SUM OF ALL RESPONSES TO PHQ9 QUESTIONS 1 & 2: 0
SUM OF ALL RESPONSES TO PHQ QUESTIONS 1-9: 0
2. FEELING DOWN, DEPRESSED OR HOPELESS: NOT AT ALL
SUM OF ALL RESPONSES TO PHQ QUESTIONS 1-9: 0
SUM OF ALL RESPONSES TO PHQ QUESTIONS 1-9: 0

## 2024-11-20 NOTE — TELEPHONE ENCOUNTER
Requested Prescriptions     Signed Prescriptions Disp Refills    metoprolol succinate (TOPROL XL) 25 MG extended release tablet 45 tablet 1     Sig: Take 0.5 tablets by mouth daily     Authorizing Provider: ZOEY HARRIS     Ordering User: GENOVEVA FITZGERALD    aspirin 81 MG EC tablet 90 tablet 3     Sig: Take 1 tablet by mouth daily     Authorizing Provider: ZOEY HARRIS     Ordering User: GENOVEVA FITZGERALD     VO per MD    Future Appointments   Date Time Provider Department Center   12/9/2024  8:00 AM Nunu Marks DO [facility] DEP

## 2024-11-20 NOTE — TELEPHONE ENCOUNTER
----- Message from Dr. Tirso Rios MD sent at 11/20/2024 12:25 PM EST -----  Note for cath completed. Lets also have her take toprol 12.5 mg qd and aspirin 81 mg qd. thx

## 2024-11-20 NOTE — TELEPHONE ENCOUNTER
Spoke to patient and scheduled her for [facility] with Dr. Roberson on 12/5 @ 9:30 am with 7:30 am arrival. Instructions sent to patient via my chart and advised labs already done, no medications to hold. Patient verbalized understanding and had no questions at the time of call.    Patient identification verified x2.          Patient Instructions    Catheterization   Pre-procedure instructions  Lab work: Already done.  [facility] Draw Sites:  [facility] [place]: [address] [facility] [address]  [place]: [address] [facility] [address]  [place]: [address] [place] [address] [facility] [address]  [facility]: [address] [facility] [address]  [place]: [address] [facility] [address]  [facility]: [address] [facility] [address]  [place]: [address] [facility] [address]  [facility]: [address] [place] [address]  You may have clear liquids up to 2 hours prior to your procedure and a light meal up to 6 hours prior to your procedure.   Drink ten 8oz. glasses of water for 3-days prior-to and 3-days post cardiac cath.  Stop blood thinners 2 days prior to procedure EXCEPT: Brilinta, Plavix or Aspirin  Medications restrictions:  No medications to hold.    Procedure day  Have a  that will bring you and take you home (6-8 hours)  Have a responsible adult that can stay with you for 24 hours  Bring ID and insurance card  Wear comfortable clothing  Leave valuables at home,   Bring: dentures, hearing aids, or glasses  Bring overnight bag (just in case of an overnight stay)  Where to report  [facility]: go through main entrance and to the left is outpatient registration    Date of procedure: 12/5 w/ Dr. Roberson  Arrival Time: 7:30 am    Post procedure instructions  No driving for 24 hours  No heavy lifting (over 10lbs) or strenuous activity for 48 hours  No baths, swimming,

## 2024-11-20 NOTE — PROGRESS NOTES
[facility] [place]: [place]  [phone]    History of Present Illness:  Ms. Weber is a 48 yo F with a history of tobacco use, chronic back pain, gastroesophageal reflux referred for unstable angina    On her last visit due to unstable angina, shortness of breath with exertion that was progressing proceeded with a echo and stress test for further evaluation.  Her stress test was abnormal with a reversible anterior/anterior apical defect consistent with ischemia.  Her echo demonstrated preserved LV function and we discussed the results.  She continues with exertional shortness of breath.  She has episodes of left-sided chest discomfort as well.  Yesterday she had numbness in with her left arm.  She is compensated on exam with clear lungs, she does have 1+ ankle edema and has noted some weight gain..  Soc hx. 1-2 ppd tobacco.   Fam hx. Dad 60 yo passed with throat CA. No early CAD  Assessment/Plan:  Unstable angina.  Her stress test was abnormal suggestive for underlying cardiac ischemia.  Her symptoms are progressing with regard to shortness of breath and chest pain.  Discussed benefits and risk of cardiac cath and she is agreeable to proceed and we will set this up soon as possible.  Will be on Toprol  12.5 mg qd, aspirin 81 mg qd.  Tobacco use.  Working on cessation    She  has a past medical history of Headache and Osteoarthritis.    All other systems negative except as above.     PE  Vitals:    11/20/24 0940   BP: 115/80   Site: Left Upper Arm   Position: Sitting   Cuff Size: Large Adult   Pulse: 73   SpO2: 97%   Weight: 134 kg (295 lb 6.4 oz)   Height: 1.778 m (5' 10\")      Body mass index is 42.39 kg/m².  General appearance - alert, well appearing, and in no distress  Mental status - affect appropriate to mood  Eyes - sclera anicteric, moist mucous membranes  Neck - supple, no JVD  Chest - clear to auscultation, no wheezes, rales or rhonchi  Heart - normal rate, regular rhythm, normal S1, S2, no murmurs,

## 2024-11-21 ENCOUNTER — TELEPHONE (OUTPATIENT)
Age: 49
End: 2024-11-21

## 2024-11-21 NOTE — TELEPHONE ENCOUNTER
----- Message from Dr. Tirso Rios MD sent at 11/21/2024  7:56 AM EST -----  Please let pt know labs were normal. Thx    Sent [employer] message with results.

## 2024-11-26 RX ORDER — SODIUM CHLORIDE 0.9 % (FLUSH) 0.9 %
5-40 SYRINGE (ML) INJECTION PRN
Status: CANCELLED | OUTPATIENT
Start: 2024-11-26

## 2024-12-05 ENCOUNTER — HOSPITAL ENCOUNTER (OUTPATIENT)
Facility: HOSPITAL | Age: 49
Discharge: HOME | End: 2024-12-05
Attending: INTERNAL MEDICINE | Admitting: INTERNAL MEDICINE
Payer: COMMERCIAL

## 2024-12-05 VITALS
OXYGEN SATURATION: 98 % | WEIGHT: 293 LBS | SYSTOLIC BLOOD PRESSURE: 146 MMHG | HEART RATE: 75 BPM | RESPIRATION RATE: 19 BRPM | DIASTOLIC BLOOD PRESSURE: 82 MMHG | BODY MASS INDEX: 41.95 KG/M2 | TEMPERATURE: 97.8 F | HEIGHT: 70 IN

## 2024-12-05 DIAGNOSIS — R94.39 ABNORMAL STRESS TEST: ICD-10-CM

## 2024-12-05 LAB — ECHO BSA: 2.58 M2

## 2024-12-05 PROCEDURE — 6360000004 HC RX CONTRAST MEDICATION: Performed by: INTERNAL MEDICINE

## 2024-12-05 PROCEDURE — 2500000003 HC RX 250 WO HCPCS: Performed by: INTERNAL MEDICINE

## 2024-12-05 PROCEDURE — C1713 ANCHOR/SCREW BN/BN,TIS/BN: HCPCS | Performed by: INTERNAL MEDICINE

## 2024-12-05 PROCEDURE — C1769 GUIDE WIRE: HCPCS | Performed by: INTERNAL MEDICINE

## 2024-12-05 PROCEDURE — 99153 MOD SED SAME PHYS/QHP EA: CPT | Performed by: INTERNAL MEDICINE

## 2024-12-05 PROCEDURE — 93458 L HRT ARTERY/VENTRICLE ANGIO: CPT | Performed by: INTERNAL MEDICINE

## 2024-12-05 PROCEDURE — 76937 US GUIDE VASCULAR ACCESS: CPT | Performed by: INTERNAL MEDICINE

## 2024-12-05 PROCEDURE — 99152 MOD SED SAME PHYS/QHP 5/>YRS: CPT | Performed by: INTERNAL MEDICINE

## 2024-12-05 PROCEDURE — 2709999900 HC NON-CHARGEABLE SUPPLY: Performed by: INTERNAL MEDICINE

## 2024-12-05 PROCEDURE — 6360000002 HC RX W HCPCS: Performed by: INTERNAL MEDICINE

## 2024-12-05 PROCEDURE — C1894 INTRO/SHEATH, NON-LASER: HCPCS | Performed by: INTERNAL MEDICINE

## 2024-12-05 RX ORDER — ACETAMINOPHEN 325 MG/1
650 TABLET ORAL EVERY 4 HOURS PRN
Status: DISCONTINUED | OUTPATIENT
Start: 2024-12-05 | End: 2024-12-05 | Stop reason: HOSPADM

## 2024-12-05 RX ORDER — SODIUM CHLORIDE 0.9 % (FLUSH) 0.9 %
5-40 SYRINGE (ML) INJECTION PRN
Status: DISCONTINUED | OUTPATIENT
Start: 2024-12-05 | End: 2024-12-05 | Stop reason: HOSPADM

## 2024-12-05 RX ORDER — SODIUM CHLORIDE 0.9 % (FLUSH) 0.9 %
5-40 SYRINGE (ML) INJECTION EVERY 12 HOURS SCHEDULED
Status: DISCONTINUED | OUTPATIENT
Start: 2024-12-05 | End: 2024-12-05 | Stop reason: HOSPADM

## 2024-12-05 RX ORDER — VERAPAMIL HYDROCHLORIDE 2.5 MG/ML
INJECTION, SOLUTION INTRAVENOUS PRN
Status: DISCONTINUED | OUTPATIENT
Start: 2024-12-05 | End: 2024-12-05 | Stop reason: HOSPADM

## 2024-12-05 RX ORDER — LIDOCAINE HYDROCHLORIDE 10 MG/ML
INJECTION, SOLUTION INFILTRATION; PERINEURAL PRN
Status: DISCONTINUED | OUTPATIENT
Start: 2024-12-05 | End: 2024-12-05 | Stop reason: HOSPADM

## 2024-12-05 RX ORDER — MIDAZOLAM HYDROCHLORIDE 1 MG/ML
INJECTION, SOLUTION INTRAMUSCULAR; INTRAVENOUS PRN
Status: DISCONTINUED | OUTPATIENT
Start: 2024-12-05 | End: 2024-12-05 | Stop reason: HOSPADM

## 2024-12-05 RX ORDER — HEPARIN SODIUM 200 [USP'U]/100ML
INJECTION, SOLUTION INTRAVENOUS CONTINUOUS PRN
Status: COMPLETED | OUTPATIENT
Start: 2024-12-05 | End: 2024-12-05

## 2024-12-05 RX ORDER — SODIUM CHLORIDE 9 MG/ML
INJECTION, SOLUTION INTRAVENOUS PRN
Status: DISCONTINUED | OUTPATIENT
Start: 2024-12-05 | End: 2024-12-05 | Stop reason: HOSPADM

## 2024-12-05 RX ORDER — IOPAMIDOL 755 MG/ML
INJECTION, SOLUTION INTRAVASCULAR PRN
Status: DISCONTINUED | OUTPATIENT
Start: 2024-12-05 | End: 2024-12-05 | Stop reason: HOSPADM

## 2024-12-05 RX ORDER — HEPARIN SODIUM 1000 [USP'U]/ML
INJECTION, SOLUTION INTRAVENOUS; SUBCUTANEOUS PRN
Status: DISCONTINUED | OUTPATIENT
Start: 2024-12-05 | End: 2024-12-05 | Stop reason: HOSPADM

## 2024-12-05 RX ORDER — FENTANYL CITRATE 50 UG/ML
INJECTION, SOLUTION INTRAMUSCULAR; INTRAVENOUS PRN
Status: DISCONTINUED | OUTPATIENT
Start: 2024-12-05 | End: 2024-12-05 | Stop reason: HOSPADM

## 2024-12-05 ASSESSMENT — PAIN - FUNCTIONAL ASSESSMENT: PAIN_FUNCTIONAL_ASSESSMENT: 0-10

## 2024-12-05 ASSESSMENT — PAIN DESCRIPTION - DESCRIPTORS: DESCRIPTORS: ACHING

## 2024-12-05 NOTE — FLOWSHEET NOTE
CATH LAB to RECOVERY ROOM REPORT    Procedure: [facility]    MD: LOUANN Roberson MD    The procedure was diagnostic only.    Verbal Report given to Recovery Nurse on patient being transferred to Cardiac Cath Lab  for routine post-op. Patient stable upon transfer to .  Vitals, mental status, MAR, procedural summary discussed with recovery RN.    Medication given during procedure:    Contrast:38 mL                          Heparin:2500 units     Versed:5 mg                                                               Fentanyl:100 mcg                                                               Other Meds/Drips given:          Sheaths:    Right radial sheath pulled at 1206 pm, band at 11mL of air

## 2024-12-05 NOTE — PROGRESS NOTES
1212  Pt arrived to recovery room post procedure.  Arm immobilizer placed on affected wrist.  Pt eating and tolerating PO diet well.     TR BANDS: Right Radial Artery  1315  Began removal of air from TR BAND. No bleeding and no hematoma present at sites.    1415  Removal of air from TR Band complete. No bleeding and no hematoma.    1425  Educated patient about their sedation precautions such as not driving, operating any machinery, or signing legal documents 24 hours post procedure.  Reviewed discharge instructions, including medications and site care using the teach back method. Answered all questions. Verbalized understanding. Pt had an opportunity to ask questions. Pt is also aware of how to handle a site if it starts bleeding or develops a hematoma.    1430  TR Band removed. No bleeding and no hematoma present. Tegaderm and gauze dressing applied.     1435  Pt walked to the restroom and voided successfully. Site(s) have no bleeding and no hematoma present    1445  RN removed pt IV.  Pt getting dressed  RN called pt ride home.    1500  Arm immobilizer placed on affected wrist.  Pt discharged to ride at main entrance of hospital via wheel chair by RN.  Pt discharged withDischarge Paperwork, Extra Band Aids, Clothing, Wrist Immobilizer, and \"Who Cared For You\" Card

## 2024-12-05 NOTE — PROGRESS NOTES
Cardiac Cath Lab Recovery Arrival Note:      Shanel Weber arrived to Cardiac Cath Lab, Recovery Area. Staff introduced to patient. Patient identifiers verified with NAME and DATE OF BIRTH. Procedure verified with patient. Consent forms reviewed and signed by patient or authorized representative and verified. Allergies verified. Patient informed of procedure and plan of care. Questions answered with review. Patient prepped for procedure, per orders from physician, prior to arrival.    Patient on cardiac monitor, non-invasive blood pressure, SPO2 monitor. Patient is A&Ox 4. Patient reports chronic Lower back pain, SOB on exertion.    Patient in stretcher, in low position, with side rails up, call bell within reach, patient instructed to call of assistance as needed.    Patient prep in: [facility] Recovery Area, Bed# FT .   Family in: waiting room. Jessica(daughter) [phone]   Prep by: QUINTIN Stafford     2881:  Patient's daughter in fast track, took patient's wallet and keys.

## 2024-12-05 NOTE — PROGRESS NOTES
1212  [facility] Procedural to Recovery REPORT:    Verbal report received from QUINTIN Grace on Shanel Weber  being received from [facility] Procedural Area for routine progression of care. Report consisted of patient’s Situation, Background, Assessment and Recommendations(SBAR). Information from the following report(s) Procedure, Findings, Medications, and Site Assessment; was reviewed with the receiving clinician. Opportunity for questions and clarification was provided. Assessment completed upon patient’s arrival to Cardiac Cath Lab RECOVERY AREA and care assumed.       Cardiac Cath Lab Recovery Arrival Note:    Shanel Weber arrived to [facility] recovery area.  Patient procedure= LHC. Patient on cardiac monitor, non-invasive blood pressure, SPO2 monitor. On room air.  IV  of NS on pump at 125 ml/hr. Patient status doing well without problems. Patient is A&Ox 4. Patient reports no complaints.    PROCEDURE SITE CHECK:    Procedure site:without any bleeding and no hematoma, no pain/discomfort reported at procedure site.     No change in patient status. Continue to monitor patient and status.

## 2024-12-05 NOTE — PROGRESS NOTES
Cardiac Cath Lab Procedure Area Arrival Note:    Shanel Weber arrived to Cardiac Cath Lab, Procedure Area. Patient identifiers verified with NAME and DATE OF BIRTH. Procedure verified with patient. Consent forms verified. Allergies verified. Patient informed of procedure and plan of care. Questions answered with review. Patient voiced understanding of procedure and plan of care.    Patient on cardiac monitor, non-invasive blood pressure, SPO2 monitor. On RA .  IV of NS on pump at 25 ml/hr. Patient status doing well without problems. Patient is A&Ox 4. Patient reports no complaints.     Patient medicated during procedure with orders obtained and verified by Dr. Roberson.    Refer to patients Cardiac Cath Lab PROCEDURE REPORT for vital signs, assessment, status, and response during procedure, printed at end of case. Printed report on chart or scanned into chart.

## 2024-12-05 NOTE — DISCHARGE INSTRUCTIONS
Cardiac Catheterization Discharge Instructions    Do not drive, operate any machinery, or sign any legal documents for 24 hours after your procedure.  You must have someone to drive you home.    You may take a shower 24 hours after your cardiac catheterization.  Be sure to get the dressing wet and then remove it; gently wash the area with warm soapy water.  Pat dry and leave open to air.  To help prevent infections, be sure to keep the cath site clean and dry.  No lotions, creams, powders, ointments, etc. in the cath site for approximately 1 week.    Do not soak site with such activities as: hand washing dishes, take a tub bath, get in a hot tub or swimming pool for approximately 5 days or until the cath site is completely healed.      No strenuous activity or heavy lifting over 10 lbs. for 7 days.    Drink plenty of fluids for 24-48 hours after your cath to flush the contrast dye from your kidneys. No alcoholic beverages for 24 hours.  You may resume your previous diet (low fat, low cholesterol) after your cath.      After your cath, some bruising or discomfort is common during the healing process.  An ice pack and Tylenol, 1-2 tablets every 6 hours as needed, is recommended if you experience any discomfort.  If you experience any signs or symptoms of infection such as fever, chills, or poorly healing incision, persistent tenderness or swelling around the cath site, redness and/or warmth to the touch, numbness, significant tingling or pain at the cath site or affected extremity, rash, drainage from the cath site, or if the affected arm or leg feels tight or swollen, call your physician right away.    30 minutes of activity a day is recommended. If it is too hot or too cold outside, perform activities in doors.    If bleeding at the cath site occurs, take a clean gauze pad and apply direct pressure to the cath site just above the puncture site.  Call 911 immediately, and continue to apply direct pressure until

## 2024-12-06 ENCOUNTER — TELEPHONE (OUTPATIENT)
Age: 49
End: 2024-12-06

## 2024-12-09 ENCOUNTER — HOSPITAL ENCOUNTER (OUTPATIENT)
Facility: HOSPITAL | Age: 49
Setting detail: OBSERVATION
Discharge: HOME | End: 2024-12-10
Attending: EMERGENCY MEDICINE | Admitting: INTERNAL MEDICINE
Payer: COMMERCIAL

## 2024-12-09 ENCOUNTER — HOSPITAL ENCOUNTER (OUTPATIENT)
Facility: HOSPITAL | Age: 49
Discharge: HOME | End: 2024-12-11
Payer: COMMERCIAL

## 2024-12-09 ENCOUNTER — OFFICE VISIT (OUTPATIENT)
Dept: PRIMARY CARE CLINIC | Facility: CLINIC | Age: 49
End: 2024-12-09
Payer: COMMERCIAL

## 2024-12-09 VITALS
TEMPERATURE: 97.1 F | HEART RATE: 78 BPM | RESPIRATION RATE: 16 BRPM | OXYGEN SATURATION: 97 % | HEIGHT: 70 IN | SYSTOLIC BLOOD PRESSURE: 128 MMHG | BODY MASS INDEX: 41.95 KG/M2 | WEIGHT: 293 LBS | DIASTOLIC BLOOD PRESSURE: 88 MMHG

## 2024-12-09 DIAGNOSIS — G47.00 INSOMNIA, UNSPECIFIED TYPE: ICD-10-CM

## 2024-12-09 DIAGNOSIS — I70.208 RADIAL ARTERY OCCLUSION, RIGHT (HCC): Primary | ICD-10-CM

## 2024-12-09 DIAGNOSIS — M79.601 RIGHT ARM PAIN: Primary | ICD-10-CM

## 2024-12-09 DIAGNOSIS — R79.89 ABNORMAL TSH: ICD-10-CM

## 2024-12-09 DIAGNOSIS — R73.09 ABNORMAL GLUCOSE: ICD-10-CM

## 2024-12-09 DIAGNOSIS — M79.601 RIGHT ARM PAIN: ICD-10-CM

## 2024-12-09 PROBLEM — I10 PRIMARY HYPERTENSION: Status: ACTIVE | Noted: 2024-12-09

## 2024-12-09 LAB
ALBUMIN SERPL-MCNC: 3.8 G/DL (ref 3.5–5)
ALBUMIN/GLOB SERPL: 0.9 (ref 1.1–2.2)
ALP SERPL-CCNC: 92 U/L (ref 45–117)
ALT SERPL-CCNC: 26 U/L (ref 12–78)
ANION GAP SERPL CALC-SCNC: 4 MMOL/L (ref 2–12)
APTT PPP: 27.6 SEC (ref 22.1–31)
AST SERPL-CCNC: 15 U/L (ref 15–37)
BASOPHILS # BLD: 0.1 K/UL (ref 0–0.1)
BASOPHILS NFR BLD: 1 % (ref 0–1)
BILIRUB SERPL-MCNC: 0.4 MG/DL (ref 0.2–1)
BUN SERPL-MCNC: 12 MG/DL (ref 6–20)
BUN/CREAT SERPL: 12 (ref 12–20)
CALCIUM SERPL-MCNC: 9.4 MG/DL (ref 8.5–10.1)
CHLORIDE SERPL-SCNC: 110 MMOL/L (ref 97–108)
CO2 SERPL-SCNC: 25 MMOL/L (ref 21–32)
COMMENT:: NORMAL
CREAT SERPL-MCNC: 0.98 MG/DL (ref 0.55–1.02)
DIFFERENTIAL METHOD BLD: ABNORMAL
EOSINOPHIL # BLD: 0.1 K/UL (ref 0–0.4)
EOSINOPHIL NFR BLD: 2 % (ref 0–7)
ERYTHROCYTE [DISTWIDTH] IN BLOOD BY AUTOMATED COUNT: 15 % (ref 11.5–14.5)
GLOBULIN SER CALC-MCNC: 4.3 G/DL (ref 2–4)
GLUCOSE SERPL-MCNC: 101 MG/DL (ref 65–100)
HCT VFR BLD AUTO: 43.1 % (ref 35–47)
HGB BLD-MCNC: 13.9 G/DL (ref 11.5–16)
IMM GRANULOCYTES # BLD AUTO: 0 K/UL (ref 0–0.04)
IMM GRANULOCYTES NFR BLD AUTO: 0 % (ref 0–0.5)
INR PPP: 1 (ref 0.9–1.1)
LYMPHOCYTES # BLD: 3.5 K/UL (ref 0.8–3.5)
LYMPHOCYTES NFR BLD: 48 % (ref 12–49)
MCH RBC QN AUTO: 30.3 PG (ref 26–34)
MCHC RBC AUTO-ENTMCNC: 32.3 G/DL (ref 30–36.5)
MCV RBC AUTO: 93.9 FL (ref 80–99)
MONOCYTES # BLD: 0.6 K/UL (ref 0–1)
MONOCYTES NFR BLD: 8 % (ref 5–13)
NEUTS SEG # BLD: 2.9 K/UL (ref 1.8–8)
NEUTS SEG NFR BLD: 41 % (ref 32–75)
NRBC # BLD: 0 K/UL (ref 0–0.01)
NRBC BLD-RTO: 0 PER 100 WBC
PLATELET # BLD AUTO: 340 K/UL (ref 150–400)
PMV BLD AUTO: 9.5 FL (ref 8.9–12.9)
POTASSIUM SERPL-SCNC: 3.8 MMOL/L (ref 3.5–5.1)
PROT SERPL-MCNC: 8.1 G/DL (ref 6.4–8.2)
PROTHROMBIN TIME: 10.2 SEC (ref 9–11.1)
RBC # BLD AUTO: 4.59 M/UL (ref 3.8–5.2)
SODIUM SERPL-SCNC: 139 MMOL/L (ref 136–145)
SPECIMEN HOLD: NORMAL
THERAPEUTIC RANGE: NORMAL SECS (ref 58–77)
UFH PPP CHRO-ACNC: 0.69 IU/ML
UFH PPP CHRO-ACNC: <0.1 IU/ML
WBC # BLD AUTO: 7.2 K/UL (ref 3.6–11)

## 2024-12-09 PROCEDURE — 99285 EMERGENCY DEPT VISIT HI MDM: CPT

## 2024-12-09 PROCEDURE — 99214 OFFICE O/P EST MOD 30 MIN: CPT | Performed by: FAMILY MEDICINE

## 2024-12-09 PROCEDURE — 96366 THER/PROPH/DIAG IV INF ADDON: CPT

## 2024-12-09 PROCEDURE — G0378 HOSPITAL OBSERVATION PER HR: HCPCS

## 2024-12-09 PROCEDURE — 6370000000 HC RX 637 (ALT 250 FOR IP): Performed by: NURSE PRACTITIONER

## 2024-12-09 PROCEDURE — 85520 HEPARIN ASSAY: CPT

## 2024-12-09 PROCEDURE — 6360000002 HC RX W HCPCS: Performed by: EMERGENCY MEDICINE

## 2024-12-09 PROCEDURE — 93971 EXTREMITY STUDY: CPT

## 2024-12-09 PROCEDURE — 36415 COLL VENOUS BLD VENIPUNCTURE: CPT

## 2024-12-09 PROCEDURE — 96365 THER/PROPH/DIAG IV INF INIT: CPT

## 2024-12-09 PROCEDURE — 80053 COMPREHEN METABOLIC PANEL: CPT

## 2024-12-09 PROCEDURE — 85610 PROTHROMBIN TIME: CPT

## 2024-12-09 PROCEDURE — 99223 1ST HOSP IP/OBS HIGH 75: CPT | Performed by: INTERNAL MEDICINE

## 2024-12-09 PROCEDURE — 96374 THER/PROPH/DIAG INJ IV PUSH: CPT

## 2024-12-09 PROCEDURE — 85025 COMPLETE CBC W/AUTO DIFF WBC: CPT

## 2024-12-09 PROCEDURE — 85730 THROMBOPLASTIN TIME PARTIAL: CPT

## 2024-12-09 RX ORDER — OXYCODONE HYDROCHLORIDE 5 MG/1
5 TABLET ORAL EVERY 6 HOURS PRN
Status: DISCONTINUED | OUTPATIENT
Start: 2024-12-09 | End: 2024-12-10 | Stop reason: HOSPADM

## 2024-12-09 RX ORDER — SODIUM CHLORIDE 9 MG/ML
INJECTION, SOLUTION INTRAVENOUS PRN
Status: DISCONTINUED | OUTPATIENT
Start: 2024-12-09 | End: 2024-12-10 | Stop reason: HOSPADM

## 2024-12-09 RX ORDER — HEPARIN SODIUM 10000 [USP'U]/100ML
15 INJECTION, SOLUTION INTRAVENOUS CONTINUOUS
Status: DISPENSED | OUTPATIENT
Start: 2024-12-09 | End: 2024-12-10

## 2024-12-09 RX ORDER — ONDANSETRON 4 MG/1
4 TABLET, ORALLY DISINTEGRATING ORAL EVERY 8 HOURS PRN
Status: DISCONTINUED | OUTPATIENT
Start: 2024-12-09 | End: 2024-12-10 | Stop reason: HOSPADM

## 2024-12-09 RX ORDER — HEPARIN SODIUM 1000 [USP'U]/ML
5000 INJECTION, SOLUTION INTRAVENOUS; SUBCUTANEOUS PRN
Status: DISCONTINUED | OUTPATIENT
Start: 2024-12-09 | End: 2024-12-10 | Stop reason: ALTCHOICE

## 2024-12-09 RX ORDER — ACETAMINOPHEN 650 MG/1
650 SUPPOSITORY RECTAL EVERY 6 HOURS PRN
Status: DISCONTINUED | OUTPATIENT
Start: 2024-12-09 | End: 2024-12-10 | Stop reason: HOSPADM

## 2024-12-09 RX ORDER — POLYETHYLENE GLYCOL 3350 17 G/17G
17 POWDER, FOR SOLUTION ORAL DAILY PRN
Status: DISCONTINUED | OUTPATIENT
Start: 2024-12-09 | End: 2024-12-10 | Stop reason: HOSPADM

## 2024-12-09 RX ORDER — ONDANSETRON 2 MG/ML
4 INJECTION INTRAMUSCULAR; INTRAVENOUS EVERY 6 HOURS PRN
Status: DISCONTINUED | OUTPATIENT
Start: 2024-12-09 | End: 2024-12-10 | Stop reason: HOSPADM

## 2024-12-09 RX ORDER — SODIUM CHLORIDE 0.9 % (FLUSH) 0.9 %
5-40 SYRINGE (ML) INJECTION EVERY 12 HOURS SCHEDULED
Status: DISCONTINUED | OUTPATIENT
Start: 2024-12-09 | End: 2024-12-10 | Stop reason: HOSPADM

## 2024-12-09 RX ORDER — ACETAMINOPHEN 325 MG/1
650 TABLET ORAL EVERY 6 HOURS PRN
Status: DISCONTINUED | OUTPATIENT
Start: 2024-12-09 | End: 2024-12-10 | Stop reason: HOSPADM

## 2024-12-09 RX ORDER — POTASSIUM CHLORIDE 7.45 MG/ML
10 INJECTION INTRAVENOUS PRN
Status: DISCONTINUED | OUTPATIENT
Start: 2024-12-09 | End: 2024-12-10 | Stop reason: HOSPADM

## 2024-12-09 RX ORDER — HEPARIN SODIUM 1000 [USP'U]/ML
10000 INJECTION, SOLUTION INTRAVENOUS; SUBCUTANEOUS ONCE
Status: COMPLETED | OUTPATIENT
Start: 2024-12-09 | End: 2024-12-09

## 2024-12-09 RX ORDER — SODIUM CHLORIDE 0.9 % (FLUSH) 0.9 %
5-40 SYRINGE (ML) INJECTION PRN
Status: DISCONTINUED | OUTPATIENT
Start: 2024-12-09 | End: 2024-12-10 | Stop reason: HOSPADM

## 2024-12-09 RX ORDER — MAGNESIUM SULFATE IN WATER 40 MG/ML
2000 INJECTION, SOLUTION INTRAVENOUS PRN
Status: DISCONTINUED | OUTPATIENT
Start: 2024-12-09 | End: 2024-12-10 | Stop reason: HOSPADM

## 2024-12-09 RX ORDER — TRAZODONE HYDROCHLORIDE 50 MG/1
50 TABLET, FILM COATED ORAL NIGHTLY PRN
Qty: 30 TABLET | Refills: 1 | Status: SHIPPED | OUTPATIENT
Start: 2024-12-09

## 2024-12-09 RX ORDER — POTASSIUM CHLORIDE 750 MG/1
40 TABLET, EXTENDED RELEASE ORAL PRN
Status: DISCONTINUED | OUTPATIENT
Start: 2024-12-09 | End: 2024-12-10 | Stop reason: HOSPADM

## 2024-12-09 RX ORDER — TRAZODONE HYDROCHLORIDE 100 MG/1
50 TABLET ORAL NIGHTLY PRN
Status: DISCONTINUED | OUTPATIENT
Start: 2024-12-09 | End: 2024-12-10 | Stop reason: HOSPADM

## 2024-12-09 RX ORDER — HEPARIN SODIUM 1000 [USP'U]/ML
10000 INJECTION, SOLUTION INTRAVENOUS; SUBCUTANEOUS PRN
Status: DISCONTINUED | OUTPATIENT
Start: 2024-12-09 | End: 2024-12-10 | Stop reason: ALTCHOICE

## 2024-12-09 RX ADMIN — HEPARIN SODIUM 15 UNITS/KG/HR: 10000 INJECTION, SOLUTION INTRAVENOUS at 14:43

## 2024-12-09 RX ADMIN — OXYCODONE HYDROCHLORIDE 5 MG: 5 TABLET ORAL at 22:21

## 2024-12-09 RX ADMIN — HEPARIN SODIUM 10000 UNITS: 1000 INJECTION INTRAVENOUS; SUBCUTANEOUS at 14:39

## 2024-12-09 RX ADMIN — OXYCODONE HYDROCHLORIDE 5 MG: 5 TABLET ORAL at 15:55

## 2024-12-09 ASSESSMENT — PAIN SCALES - GENERAL
PAINLEVEL_OUTOF10: 10
PAINLEVEL_OUTOF10: 0
PAINLEVEL_OUTOF10: 8
PAINLEVEL_OUTOF10: 4
PAINLEVEL_OUTOF10: 10

## 2024-12-09 ASSESSMENT — PATIENT HEALTH QUESTIONNAIRE - PHQ9
SUM OF ALL RESPONSES TO PHQ9 QUESTIONS 1 & 2: 1
SUM OF ALL RESPONSES TO PHQ QUESTIONS 1-9: 1
SUM OF ALL RESPONSES TO PHQ QUESTIONS 1-9: 1
1. LITTLE INTEREST OR PLEASURE IN DOING THINGS: NOT AT ALL
SUM OF ALL RESPONSES TO PHQ QUESTIONS 1-9: 1
2. FEELING DOWN, DEPRESSED OR HOPELESS: SEVERAL DAYS
SUM OF ALL RESPONSES TO PHQ QUESTIONS 1-9: 1

## 2024-12-09 ASSESSMENT — PAIN - FUNCTIONAL ASSESSMENT
PAIN_FUNCTIONAL_ASSESSMENT: PREVENTS OR INTERFERES WITH ALL ACTIVE AND SOME PASSIVE ACTIVITIES
PAIN_FUNCTIONAL_ASSESSMENT: 0-10

## 2024-12-09 ASSESSMENT — PAIN DESCRIPTION - ORIENTATION
ORIENTATION: RIGHT
ORIENTATION: RIGHT

## 2024-12-09 ASSESSMENT — PAIN DESCRIPTION - DESCRIPTORS
DESCRIPTORS: ACHING;NUMBNESS;DISCOMFORT
DESCRIPTORS: ACHING;THROBBING

## 2024-12-09 ASSESSMENT — PAIN DESCRIPTION - PAIN TYPE: TYPE: ACUTE PAIN

## 2024-12-09 ASSESSMENT — PAIN DESCRIPTION - LOCATION
LOCATION: ARM
LOCATION: HAND;WRIST

## 2024-12-09 ASSESSMENT — PAIN DESCRIPTION - ONSET: ONSET: SUDDEN

## 2024-12-09 NOTE — PROGRESS NOTES
\"Have you been to the ER, urgent care clinic since your last visit?  Hospitalized since your last visit?\"    YES - When: approximately 4 days ago.  Where and Why: [facility].    “Have you seen or consulted any other health care providers outside our system since your last visit?”    NO    Have you had a mammogram?”   YES - Where: [facility] Nurse/CMA to request most recent records if not in the chart    No breast cancer screening on file      “Have you had a pap smear?”    YES - Where: [facility] 10/24 Nurse/CMA to request most recent records if not in the chart    No cervical cancer screening on file       “Have you had a colorectal cancer screening such as a colonoscopy/FIT/Cologuard?    NO    No colonoscopy on file  No cologuard on file  No FIT/FOBT on file   No flexible sigmoidoscopy on file

## 2024-12-09 NOTE — PROGRESS NOTES
HPI     Chief Complaint   Patient presents with    Follow-up     She is a 49 y.o. female who presents for follow up.    Saw Dr. Hernandez in October 2024 for dyspnea on exertion. Was referred to Cards who did a stress test that showed study most consistent with MI. Underwent a cath that showed normal coronary arteries and upper limit LV end diastolic pressure. She states she has been having R arm pain from where they tried to insert an IV. She is wearing a brace to keep her arm from moving today. She states she has been very uncomfortable since and is not able to sleep due to severe pain in her R arm.     She has appt with Pulm Dr. Elizabeth on 1/9/24 for a breathing test.     She has been having chronic back pain. She can't do PT at this time due to concern for needing the cardiac cath and being told she would likely need a stent. She is followed by Lucy Ramírez. No saddle anesthesia/ urinary incontinence. She has an MRI scheduled 12/30/24 to investigate for bulging discs.     She is having a hard time sleeping. She has not use any sleep aids in the past. She has been more agitated. Denies SI/ HI.     In October she also had a mildly elevated TSH and minimally elevated glucose. Last lipids checked in March 2023.     Reviewed PmHx, RxHx, FmHx, SocHx, AllgHx and updated and dated in the chart.    Physical Exam:  /88 (Site: Left Upper Arm, Position: Sitting, Cuff Size: Large Adult)   Pulse 78   Temp 97.1 °F (36.2 °C) (Temporal)   Resp 16   Ht 1.778 m (5' 10\")   Wt (!) 137 kg (302 lb)   SpO2 97%   BMI 43.33 kg/m²   Physical Exam  Vitals and nursing note reviewed.   Constitutional:       General: She is not in acute distress.     Appearance: Normal appearance. She is not ill-appearing.   Cardiovascular:      Rate and Rhythm: Normal rate and regular rhythm.      Heart sounds: No murmur heard.  Pulmonary:      Effort: Pulmonary effort is normal. No respiratory distress.      Breath sounds: Normal

## 2024-12-09 NOTE — ED PROVIDER NOTES
Extraocular movements intact.      Conjunctiva/sclera: Conjunctivae normal.   Cardiovascular:      Rate and Rhythm: Normal rate.      Pulses:           Radial pulses are 0 on the right side.   Pulmonary:      Effort: Pulmonary effort is normal. No respiratory distress.   Abdominal:      General: There is no distension.   Musculoskeletal:         General: No deformity.      Right hand: Decreased capillary refill.      Cervical back: Normal range of motion.   Skin:     General: Skin is warm and dry.   Neurological:      General: No focal deficit present.      Mental Status: She is alert.         DIAGNOSTIC RESULTS     EKG: All EKG's are interpreted by the Emergency Department Physician listed in the interpretation in the absence of a cardiologist and may also be found below under Reassessment/ED Course.           RADIOLOGY:   Non-plain film images such as CT, Ultrasound and MRI are read by the radiologist. Plain radiographic images are visualized and preliminarily interpreted by the emergency physician with the below findings:    Interpretation per the Radiologist below, if available at the time of this note:    No orders to display        LABS:  Labs Reviewed   CBC WITH AUTO DIFFERENTIAL - Abnormal; Notable for the following components:       Result Value    RDW 15.0 (*)     All other components within normal limits   COMPREHENSIVE METABOLIC PANEL - Abnormal; Notable for the following components:    Chloride 110 (*)     Glucose 101 (*)     Globulin 4.3 (*)     Albumin/Globulin Ratio 0.9 (*)     All other components within normal limits   PROTIME-INR   EXTRA TUBES HOLD   APTT   HEPARIN, ANTI-XA   HEPARIN, ANTI-XA   HEPARIN, ANTI-XA   HEPARIN, ANTI-XA   CBC       All other labs were within normal range or not returned as of this dictation.    EMERGENCY DEPARTMENT COURSE and DIFFERENTIAL DIAGNOSIS/MDM:   Vitals:    Vitals:    12/09/24 1149   BP: (!) 149/98   Pulse: 77   Resp: 20   Temp: 97.8 °F (36.6 °C)   TempSrc: Oral    SpO2: 97%   Weight: (!) 137.3 kg (302 lb 11.1 oz)   Height: 1.778 m (5' 10\")           Medical Decision Making  Amount and/or Complexity of Data Reviewed  Labs: ordered.    Risk  Prescription drug management.  Decision regarding hospitalization.            REASSESSMENT      12:33 PM  Discussed with Dr. Horan,  history and vascular studies discussed.  Notes this needs to be managed by her cardiologist.  Cardiology consult placed.     1:13 PM  Discussed with cardiology NP, Dr Roberson is in procedure.  Relayed vascular US showing acute radial artery occlusion.  Had cardiac cath on Thursday with right radial access.  Heparin drip ordered by patient remains in waiting room.  They will come to see.     3:15 PM  Patient admitted by cardiology for observation,  continuing heparin drip.     CRITICAL CARE TIME   Total Critical Care time was 35 minutes, excluding separately reportable procedures.     CONSULTS:  IP CONSULT TO VASCULAR SURGERY  IP CONSULT TO VASCULAR SURGERY  IP CONSULT TO CARDIOLOGY    PROCEDURES:  Unless otherwise noted below, none     Procedures        FINAL IMPRESSION      1. Radial artery occlusion, right (HCC)          DISPOSITION/PLAN   DISPOSITION        PATIENT REFERRED TO:  No follow-up provider specified.    DISCHARGE MEDICATIONS:  New Prescriptions    No medications on file         (Please note that portions of this note were completed with a voice recognition program.  Efforts were made to edit the dictations but occasionally words are mis-transcribed.)    Niharika Redd MD (electronically signed)  Emergency Attending Physician            Niharika Redd MD  12/09/24 5065

## 2024-12-09 NOTE — CONSULTS
[facility] CARDIOLOGY  Cardiology Care Note                  [x]Initial visit     []Established visit     Patient Name: Shanel Weber - :1975 - MRN:[id number]  Primary Cardiologist: Tirso Rios MD  Consulting Cardiologist: Elder Roberson MD     Reason for initial visit:  R hand pain     Patient seen and examined by me with the above nurse practitioner.  I personally performed all components of the history, physical, and medical decision making and agree with the assessment and plan with minor modifications as noted.     Today the patient presents with right arm discomfort in the setting of radial artery occlusion after radial artery catheterization.  This is a known potential complication that occurred in about 5% of patients and is usually asymptomatic.    General PE  Gen:  NAD  Mental Status - Alert. General Appearance - Not in acute distress.   HEENT:  PERRL, no carotid bruits or JVD  Chest and Lung Exam   Inspection: Accessory muscles - No use of accessory muscles in breathing.   Auscultation:   Breath sounds: - Normal.   Cardiovascular   Inspection: Jugular vein - Bilateral - Inspection Normal.   Palpation/Percussion:   Apical Impulse: - Normal.   Auscultation: Rhythm - Regular. Heart Sounds - S1 WNL and S2 WNL. No S3 or S4.   Murmurs & Other Heart Sounds: Auscultation of the heart reveals - No Murmurs.   Peripheral Vascular   Upper Extremity: Inspection - Bilateral - No Cyanotic nailbeds or Digital clubbing.  Right arm radial pulse absent, ulnar pulses excellent, normal coloration to the hand, moving hand well with reported limited range of motion.  Lower Extremity:   Palpation: Edema - Bilateral - No edema.  Abdomen:   Soft, non-tender, bowel sounds are active.  Neuro: A&O times 3, CN and motor grossly WNL    Today the patient presents with right arm discomfort in the setting of radial artery occlusion after radial

## 2024-12-09 NOTE — ED TRIAGE NOTES
Pt arrives c/o cardiac cath on Thursday and c/o right radial wrist pain. Pt was seen by primary care and sent outpatient US. Pt does not take any blood thinners. Pt reports pain/numbness/tingling radiates to right thumb and pointer finger. No coolness noted to right hand.

## 2024-12-10 VITALS
SYSTOLIC BLOOD PRESSURE: 156 MMHG | TEMPERATURE: 97.9 F | RESPIRATION RATE: 18 BRPM | OXYGEN SATURATION: 97 % | DIASTOLIC BLOOD PRESSURE: 96 MMHG | WEIGHT: 293 LBS | HEART RATE: 64 BPM | HEIGHT: 70 IN | BODY MASS INDEX: 41.95 KG/M2

## 2024-12-10 LAB
ERYTHROCYTE [DISTWIDTH] IN BLOOD BY AUTOMATED COUNT: 14.9 % (ref 11.5–14.5)
HCT VFR BLD AUTO: 38.2 % (ref 35–47)
HGB BLD-MCNC: 12.4 G/DL (ref 11.5–16)
MCH RBC QN AUTO: 30.3 PG (ref 26–34)
MCHC RBC AUTO-ENTMCNC: 32.5 G/DL (ref 30–36.5)
MCV RBC AUTO: 93.4 FL (ref 80–99)
NRBC # BLD: 0 K/UL (ref 0–0.01)
NRBC BLD-RTO: 0 PER 100 WBC
PLATELET # BLD AUTO: 299 K/UL (ref 150–400)
PMV BLD AUTO: 9.8 FL (ref 8.9–12.9)
RBC # BLD AUTO: 4.09 M/UL (ref 3.8–5.2)
UFH PPP CHRO-ACNC: 0.45 IU/ML
UFH PPP CHRO-ACNC: 0.53 IU/ML
WBC # BLD AUTO: 6.7 K/UL (ref 3.6–11)

## 2024-12-10 PROCEDURE — 97165 OT EVAL LOW COMPLEX 30 MIN: CPT

## 2024-12-10 PROCEDURE — 6370000000 HC RX 637 (ALT 250 FOR IP): Performed by: NURSE PRACTITIONER

## 2024-12-10 PROCEDURE — 6360000002 HC RX W HCPCS: Performed by: EMERGENCY MEDICINE

## 2024-12-10 PROCEDURE — 97110 THERAPEUTIC EXERCISES: CPT

## 2024-12-10 PROCEDURE — 85520 HEPARIN ASSAY: CPT

## 2024-12-10 PROCEDURE — 96366 THER/PROPH/DIAG IV INF ADDON: CPT

## 2024-12-10 PROCEDURE — G0378 HOSPITAL OBSERVATION PER HR: HCPCS

## 2024-12-10 PROCEDURE — 36415 COLL VENOUS BLD VENIPUNCTURE: CPT

## 2024-12-10 PROCEDURE — 85027 COMPLETE CBC AUTOMATED: CPT

## 2024-12-10 RX ADMIN — HEPARIN SODIUM 15 UNITS/KG/HR: 10000 INJECTION, SOLUTION INTRAVENOUS at 03:32

## 2024-12-10 RX ADMIN — APIXABAN 10 MG: 5 TABLET, FILM COATED ORAL at 10:22

## 2024-12-10 NOTE — ED NOTES
ED TO INPATIENT SBAR HANDOFF    Patient Name: Shanel Weber   :  1975  49 y.o.   MRN:  [id number]  ED Room #:  ER06/06     Situation  Code Status: Full Code   Allergies: Patient has no known allergies.  Weight: Patient Vitals for the past 96 hrs (Last 3 readings):   Weight   24 1149 (!) 137.3 kg (302 lb 11.1 oz)       Arrived from: home    Chief Complaint:   Chief Complaint   Patient presents with    Post-op Problem       Hospital Problem/Diagnosis:  Principal Problem:    Radial artery occlusion, right (HCC)  Active Problems:    Primary hypertension    Adult BMI 40.0-44.9 kg/sq m  Resolved Problems:    * No resolved hospital problems. *      Mobility: no current mobility problem   ED Fall Risk: Presents to emergency department  because of falls (Syncope, seizure, or loss of consciousness): No, Age > 70: No, Altered Mental Status, Intoxication with alcohol or substance confusion (Disorientation, impaired judgment, poor safety awaremess, or inability to follow instructions): No, Impaired Mobility: Ambulates or transfers with assistive devices or assistance; Unable to ambulate or transer.: Yes, Nursing Judgement: Yes   Fell in ED or prior to admission: no   Restraints: no     Sitter: no   Family/Caregiver Present: yes    Neet to know social/safety information: none    Background  History:   Past Medical History:   Diagnosis Date    Headache     Osteoarthritis     Primary hypertension 2024       Assessment    Abnormal Assessment Findings: cap refill right hand > 3 sec  Imaging:   No orders to display     Abnormal labs:   Abnormal Labs Reviewed   CBC WITH AUTO DIFFERENTIAL - Abnormal; Notable for the following components:       Result Value    RDW 15.0 (*)     All other components within normal limits   COMPREHENSIVE METABOLIC PANEL - Abnormal; Notable for the following components:    Chloride 110 (*)     Glucose 101 (*)     Globulin 4.3 (*)     Albumin/Globulin Ratio 0.9 (*)     All other

## 2024-12-10 NOTE — PROGRESS NOTES
OCCUPATIONAL THERAPY EVALUATION/DISCHARGE  Patient: Shanel Weber (49 y.o. female)  Date: 12/10/2024  Primary Diagnosis: Radial artery occlusion, right (HCC) [I70.208]         Precautions: Surgical Protocols (limited to 5# lifting since cardiac cath)                     ASSESSMENT :  Based on the objective data below, the patient presents at a functional level s/p admission for R radial artery occlusion, on heparin. She is IND at baseline, recently had a cardiac cath with post-procedure RUE swelling, pain and loss of function. She now presents at a functional level, able to complete distal AROM of dominant RUE with slight weakness and discomfort at DVT site. She has been ad sherice for ADLs and mobility with no difficulty. Issued HEP handouts for RUE with education provided via pacing and progression. She reports no further concerns, plan for d/c today. No further acute OT needs, safe for d/c.     Functional Outcome Measure:  The patient scored 24/24 on the AM-PAC outcome measure which is indicative of lower odds of rehab needs upon d/c.      Further skilled acute occupational therapy is not indicated at this time.     PLAN :  Recommend with staff: up ad sherice, ADLs in bathroom, hallway mobility    Recommendation for discharge: (in order for the patient to meet his/her long term goals):   No skilled occupational therapy    Other factors to consider for discharge: no additional factors    IF patient discharges home will need the following DME: none     SUBJECTIVE:   Patient stated, “It still hurts at the site but I can move it more today than I could yesterday so the blood thinner must be working.”    OBJECTIVE DATA SUMMARY:     Past Medical History:   Diagnosis Date    Headache     Osteoarthritis     Primary hypertension 12/9/2024     Past Surgical History:   Procedure Laterality Date    CARDIAC PROCEDURE N/A 12/5/2024    Left heart cath / coronary angiography performed by Elder Roberson MD at [facility] CARDIAC CATH

## 2024-12-10 NOTE — ED NOTES
Verbal shift change report given to Mello (oncoming nurse) by Huyen (offgoing nurse). Report included the following information Nurse Handoff Report, Index, ED SBAR, MAR, and Recent Results.

## 2024-12-10 NOTE — PROGRESS NOTES
Vascular Surgery  --seems to be doing much better  --  Vitals:    12/10/24 0850   BP: (!) 156/96   Pulse: 64   Resp: 18   Temp: 97.9 °F (36.6 °C)   SpO2: 97%     Very warm hand; well functioning; palpable ulnar pulse    Plan:  --ok to d/c to home today on eliquis  (started pack); would recommend 3 months and continue asa 81

## 2024-12-10 NOTE — CONSULTS
[place] [id number]                              CONSULTATION      PATIENT NAME: DAVID ZABALA           : 1975  MED REC NO: [id number]                       ROOM: [facility]  ACCOUNT NO: [id number]                       ADMIT DATE: 2024  PROVIDER: Rahat Khan MD    DATE OF SERVICE:  2024    ATTENDING PHYSICIAN:  Elder Roberson MD    REASON FOR CONSULTATION:  Right radial artery occlusion.    HISTORY OF PRESENT ILLNESS:  The patient is a 49-year-old female, who is approximately 4 days status post cardiac catheterization from the right radial approach.  She reports having severe pain in her hand for 4 days.  She is found to have a right radial artery occlusion and pain in her medial hand into her thumb.  She reports some mild weakness of her thumb.    PAST MEDICAL HISTORY:  Significant for morbid obesity, heavy tobacco history, chronic back pain.    CURRENT MEDICINES:  Include Toprol-XL, baby aspirin, albuterol inhaler, Tylenol, Mobic.    ALLERGIES:  SHE HAS NO KNOWN DRUG ALLERGIES.  HER BMI IS 43.4.    SOCIAL HISTORY:  Positive for heavy tobacco use.  No daily alcohol.    PHYSICAL EXAMINATION:  VITAL SIGNS:  Temperature is 97.1, heart rate 78, blood pressure is 128/88.  GENERAL:  No acute distress, is alert and oriented.  LUNGS:  Clear to auscultation bilaterally.  HEART:  Regular rate and rhythm.  She has a palpable right brachial and right ulnar pulse.  The hand is very warm.  Her sensation is intact.  She has some mild weakness associated with the thumb and 1st finger.    LABORATORY DATA:  Lab values have been reviewed.    IMPRESSION AND PLAN:  She has been started on a heparin drip.  There was a right arm venous ultrasound that does not show any venous thrombosis, but there was an incidental finding of a right radial artery occlusion with some waveform appearing moderately abnormal with the remainder of the  waveforms were normal.    A 49-year-old female with multiple medical issues, who was found to have a right radial artery occlusion following cardiac catheterization normally with strong ulnar artery flow as she has and what appears to be an intact palmar arch.  Based on physical exam, this is usually well-tolerated with anticoagulation due to her severe pain.  She is agreeable to being admitted on a heparin drip to see how she does.  If she worsens or does not improve, I have discussed we may try to undergo a radial artery thrombectomy which may or may not alleviate the situation.  I have explained this to her.  We will follow along and see how she does.        LATISHA SO MD AM/SAUL  D:  12/09/2024 16:43:07  T:  12/09/2024 21:09:07  JOB #:  [id number]

## 2024-12-10 NOTE — DISCHARGE SUMMARY
[facility] Cardiology   [phone]  Cardiology Discharge Summary     Patient ID:  Shanel Weber  [id number]  49 y.o.  1975    Admit Date: 12/9/2024    Discharge Date: 12/10/2024     Admitting Physician: Elder Roberson MD     Discharge Physician: Rosetta Kirkpatrick, APRN - CNP/Dr. Roberson     Admission Diagnoses:   Radial artery occlusion, right (HCC) [I70.208]    Discharge Diagnoses:   Principal Problem:    Radial artery occlusion, right (HCC)  Active Problems:    Primary hypertension    Adult BMI 40.0-44.9 kg/sq m  Resolved Problems:    * No resolved hospital problems. *      Discharge Condition: Stable    Patient Active Problem List    Diagnosis Date Noted    Radial artery occlusion, right (HCC) 12/09/2024    Primary hypertension 12/09/2024    Adult BMI 40.0-44.9 kg/sq m 12/09/2024    Abnormal stress test 11/20/2024      Past Medical History:   Diagnosis Date    Headache     Osteoarthritis     Primary hypertension 12/9/2024      Social History     Socioeconomic History    Marital status: Single     Spouse name: None    Number of children: None    Years of education: None    Highest education level: None   Tobacco Use    Smoking status: Every Day     Current packs/day: 0.00     Average packs/day: 1 pack/day for 34.4 years (34.4 ttl pk-yrs)     Types: Cigarettes     Start date: 6/1/1995     Last attempt to quit: 11/2/2014     Years since quitting: 10.1     Passive exposure: Current   Vaping Use    Vaping status: Never Used   Substance and Sexual Activity    Alcohol use: No    Drug use: No    Sexual activity: Yes     Partners: Male     Social Determinants of Health     Financial Resource Strain: Low Risk  (10/8/2024)    Overall Financial Resource Strain (CARDIA)     Difficulty of Paying Living Expenses: Not hard at all   Food Insecurity: No Food Insecurity (12/10/2024)    Hunger Vital Sign     Worried About Running Out of Food in the Last Year: Never true      tablet  Commonly known as: DESYREL  Take 1 tablet by mouth nightly as needed for Sleep            ASK your doctor about these medications      metoprolol succinate 25 MG extended release tablet  Commonly known as: TOPROL XL  Take 0.5 tablets by mouth daily               Where to Get Your Medications        These medications were sent to [facility]/pharmacy #[id number] - [place], [place] - [address] - P [phone] - F [phone]  [address] [address]      Hours: 24-hours Phone: [phone]   apixaban 5 MG Tabs tablet  apixaban starter pack 5 MG Tbpk tablet          Referenced discharge instructions provided by nursing for diet and activity.    Follow up with:  Future Appointments   Date Time Provider Department Center   1/8/2025  9:20 AM Tirso Rios MD [facility]   1/24/2025  4:00 PM Nunu Marks DO [facility] DEP         Signed:  UMBERTO Decker CNP  12/10/2024  9:52 AM

## 2024-12-11 ENCOUNTER — TELEPHONE (OUTPATIENT)
Dept: PRIMARY CARE CLINIC | Facility: CLINIC | Age: 49
End: 2024-12-11

## 2024-12-11 LAB — ECHO BSA: 2.6 M2

## 2024-12-11 NOTE — TELEPHONE ENCOUNTER
Patient discharged from [facility] on 12/10/24  Needs to be contacted for hosp follow up visit

## 2024-12-12 NOTE — TELEPHONE ENCOUNTER
Called patient to schedule hospital follow up appointment. Scheduled patient for appointment 12/19 at 12:15pm per Dr. Marks.

## 2024-12-19 ENCOUNTER — OFFICE VISIT (OUTPATIENT)
Dept: PRIMARY CARE CLINIC | Facility: CLINIC | Age: 49
End: 2024-12-19
Payer: COMMERCIAL

## 2024-12-19 VITALS
BODY MASS INDEX: 41.95 KG/M2 | DIASTOLIC BLOOD PRESSURE: 74 MMHG | HEART RATE: 85 BPM | SYSTOLIC BLOOD PRESSURE: 113 MMHG | TEMPERATURE: 97.8 F | HEIGHT: 70 IN | OXYGEN SATURATION: 97 % | WEIGHT: 293 LBS | RESPIRATION RATE: 16 BRPM

## 2024-12-19 DIAGNOSIS — I70.208 RADIAL ARTERY OCCLUSION, RIGHT (HCC): Primary | ICD-10-CM

## 2024-12-19 DIAGNOSIS — L30.4 INTERTRIGO: ICD-10-CM

## 2024-12-19 DIAGNOSIS — Z09 HOSPITAL DISCHARGE FOLLOW-UP: ICD-10-CM

## 2024-12-19 PROCEDURE — 3074F SYST BP LT 130 MM HG: CPT | Performed by: FAMILY MEDICINE

## 2024-12-19 PROCEDURE — 3078F DIAST BP <80 MM HG: CPT | Performed by: FAMILY MEDICINE

## 2024-12-19 PROCEDURE — 1111F DSCHRG MED/CURRENT MED MERGE: CPT | Performed by: FAMILY MEDICINE

## 2024-12-19 PROCEDURE — 99214 OFFICE O/P EST MOD 30 MIN: CPT | Performed by: FAMILY MEDICINE

## 2024-12-19 RX ORDER — NYSTATIN 100000 [USP'U]/G
POWDER TOPICAL
Qty: 60 G | Refills: 2 | Status: SHIPPED | OUTPATIENT
Start: 2024-12-19

## 2024-12-19 RX ORDER — GABAPENTIN 400 MG/1
400 CAPSULE ORAL 3 TIMES DAILY
COMMUNITY
Start: 2024-12-12 | End: 2025-01-11

## 2024-12-19 NOTE — PROGRESS NOTES
Health Decision Maker has been checked with the patient      AI form NOT signed    Chief Complaint   Patient presents with    Follow-Up from Hospital       \"Have you been to the ER, urgent care clinic since your last visit?  Hospitalized since your last visit?\"    YES - When: approximately 2  weeks ago.  Where and Why: ER DVT.    “Have you seen or consulted any other health care providers outside of [facility] since your last visit?”    NO      There were no vitals filed for this visit.   Depression: Not at risk (12/9/2024)    PHQ-2     PHQ-2 Score: 1      Have you had a mammogram?”   NO    No breast cancer screening on file         “Have you had a colorectal cancer screening such as a colonoscopy/FIT/Cologuard?    NO    No colonoscopy on file  No cologuard on file  No FIT/FOBT on file   No flexible sigmoidoscopy on file         Click Here for Release of Records Request    Chart reviewed: immunizations are documented.     There is no immunization history on file for this patient.   
until 1/8/25. States overall hand/ arm is starting to feel better and has more movement since she was seen and started on Eliquis.      She mentions she has intertrigo under breast folds and would like a cream or powder to help. Area is moist and slightly pink and itchy to feel. Does not feel it needs to be looked at.     Interval history/Current status:     Patient Active Problem List   Diagnosis    Abnormal stress test    Radial artery occlusion, right (HCC)    Primary hypertension    Adult BMI 40.0-44.9 kg/sq m       Medications listed as ordered at the time of discharge from hospital     Medication List            Accurate as of December 19, 2024 11:59 PM. If you have any questions, ask your nurse or doctor.                START taking these medications      nystatin 354994 UNIT/GM powder  Commonly known as: MYCOSTATIN  Apply 3 times daily.  Started by: Dr. Nunu Marks, DO            CONTINUE taking these medications      acetaminophen 325 MG tablet  Commonly known as: TYLENOL     albuterol sulfate  (90 Base) MCG/ACT inhaler  Commonly known as: PROVENTIL;VENTOLIN;PROAIR  Inhale 2 puffs into the lungs every 6 hours as needed for Wheezing     * apixaban starter pack 5 MG Tbpk tablet  Commonly known as: Eliquis DVT/PE Starter Pack  Take 1 tablet by mouth See Admin Instructions     * apixaban 5 MG Tabs tablet  Commonly known as: Eliquis  Take 1 tablet by mouth 2 times daily Not to be started til completed starter pack for 30 days  Start taking on: January 11, 2025     aspirin 81 MG EC tablet  Take 1 tablet by mouth daily     gabapentin 400 MG capsule  Commonly known as: NEURONTIN     meloxicam 15 MG tablet  Commonly known as: MOBIC  Take 1 tablet by mouth daily     metoprolol succinate 25 MG extended release tablet  Commonly known as: TOPROL XL  Take 0.5 tablets by mouth daily     traZODone 50 MG tablet  Commonly known as: DESYREL  Take 1 tablet by mouth nightly as needed for Sleep           * This

## 2025-01-24 ENCOUNTER — TELEPHONE (OUTPATIENT)
Dept: PRIMARY CARE CLINIC | Facility: CLINIC | Age: 50
End: 2025-01-24

## 2025-01-24 DIAGNOSIS — R73.09 ABNORMAL GLUCOSE: ICD-10-CM

## 2025-01-24 DIAGNOSIS — R79.89 ABNORMAL TSH: ICD-10-CM

## 2025-01-24 LAB
EST. AVERAGE GLUCOSE BLD GHB EST-MCNC: 97 MG/DL
HBA1C MFR BLD: 5 % (ref 4–5.6)
T4 FREE SERPL-MCNC: 0.9 NG/DL (ref 0.8–1.5)
TSH SERPL DL<=0.05 MIU/L-ACNC: 2 UIU/ML (ref 0.36–3.74)

## 2025-01-24 NOTE — TELEPHONE ENCOUNTER
Called and left a voicemail for patient to call back and reschedule to 2/6 at 1230pm - provider is not seeing patients today

## 2025-01-24 NOTE — TELEPHONE ENCOUNTER
Patient came into the office and was rescheduled to 2/6/25 per the request of Dr Marks.  Patient woke up with a stye on her left eyelid and wanted to know if Dr Marks could call something into pharmacy and contact patient back at Ph#[phone] or daughter, Jessica [phone]

## 2025-01-27 LAB — T3 SERPL-MCNC: 121 NG/DL (ref 71–180)

## 2025-02-03 ENCOUNTER — OFFICE VISIT (OUTPATIENT)
Age: 50
End: 2025-02-03
Payer: COMMERCIAL

## 2025-02-03 VITALS
DIASTOLIC BLOOD PRESSURE: 90 MMHG | HEIGHT: 70 IN | HEART RATE: 124 BPM | WEIGHT: 293 LBS | SYSTOLIC BLOOD PRESSURE: 138 MMHG | OXYGEN SATURATION: 97 % | BODY MASS INDEX: 41.95 KG/M2

## 2025-02-03 DIAGNOSIS — I70.208 OCCLUSION OF RIGHT RADIAL ARTERY (HCC): Primary | ICD-10-CM

## 2025-02-03 DIAGNOSIS — Z87.891 HISTORY OF TOBACCO USE: ICD-10-CM

## 2025-02-03 PROCEDURE — 3080F DIAST BP >= 90 MM HG: CPT | Performed by: INTERNAL MEDICINE

## 2025-02-03 PROCEDURE — 99213 OFFICE O/P EST LOW 20 MIN: CPT | Performed by: INTERNAL MEDICINE

## 2025-02-03 PROCEDURE — 3075F SYST BP GE 130 - 139MM HG: CPT | Performed by: INTERNAL MEDICINE

## 2025-02-03 ASSESSMENT — PATIENT HEALTH QUESTIONNAIRE - PHQ9
SUM OF ALL RESPONSES TO PHQ9 QUESTIONS 1 & 2: 0
SUM OF ALL RESPONSES TO PHQ QUESTIONS 1-9: 0
1. LITTLE INTEREST OR PLEASURE IN DOING THINGS: NOT AT ALL
2. FEELING DOWN, DEPRESSED OR HOPELESS: NOT AT ALL
SUM OF ALL RESPONSES TO PHQ QUESTIONS 1-9: 0

## 2025-02-03 NOTE — PROGRESS NOTES
[facility] [place]:   [phone]    History of Present Illness:  Ms. Weber is a 49 yo F with a history of tobacco use, chronic back pain, gastroesophageal reflux referred for unstable angina    On her last visit, due to chest pain, shortness of breath and abnormal stress test proceeded with a cardiac cath with Dr. Roberson at the beginning of December.  Her cardiac cath demonstrated no significant CAD.  However, she developed severe right arm pain afterwards and presented back to the hospital, was found to have radial artery occlusion post cardiac cath through the radial artery.  She was seen by Vascular, Dr. Khan, who recommended 24-hour heparin and then Eliquis until March.  For the first week or two afterwards things were getting better, but she has had increased swelling and numbness in the hands this past week.  She was directed to Orthopedic who is redirecting her to Vascular. She is compensated on exam with clear lungs.  Her right hand is warm and she does have an ulnar pulse.  She does have some slight swelling in her fingers.  Will reach out to Vascular as she is waiting here back for an appointment to see them.  Soc hx. 1-2 ppd tobacco.   Fam hx. Dad 62 yo passed with throat CA. No early CAD  Assessment and Plan:  1. Numbness, swelling in the right hand.  Will have her follow up with Vascular.  2. Shortness of breath, chest pain.  Non-cardiac and her cardiac cath demonstrated no significant CAD.  She is getting pulmonary evaluation.  3. Tobacco use.  Encouraged cessation.        She  has a past medical history of Headache, Osteoarthritis, and Primary hypertension.    All other systems negative except as above.     PE  Vitals:    02/03/25 0924   BP: (!) 138/90   Site: Left Upper Arm   Position: Sitting   Cuff Size: Large Adult   Pulse: (!) 124   SpO2: 97%   Weight: 134.3 kg (296 lb)   Height: 1.778 m (5' 10\")        Body mass index is 42.47 kg/m².  General appearance - alert, well

## 2025-02-27 ENCOUNTER — OFFICE VISIT (OUTPATIENT)
Dept: PRIMARY CARE CLINIC | Facility: CLINIC | Age: 50
End: 2025-02-27
Payer: COMMERCIAL

## 2025-02-27 ENCOUNTER — APPOINTMENT (OUTPATIENT)
Facility: HOSPITAL | Age: 50
End: 2025-02-27
Payer: COMMERCIAL

## 2025-02-27 ENCOUNTER — HOSPITAL ENCOUNTER (EMERGENCY)
Facility: HOSPITAL | Age: 50
Discharge: HOME | End: 2025-02-27
Attending: EMERGENCY MEDICINE
Payer: COMMERCIAL

## 2025-02-27 VITALS
WEIGHT: 293 LBS | BODY MASS INDEX: 43.05 KG/M2 | RESPIRATION RATE: 18 BRPM | HEART RATE: 68 BPM | SYSTOLIC BLOOD PRESSURE: 120 MMHG | OXYGEN SATURATION: 97 % | DIASTOLIC BLOOD PRESSURE: 49 MMHG | TEMPERATURE: 97.4 F

## 2025-02-27 VITALS
DIASTOLIC BLOOD PRESSURE: 80 MMHG | BODY MASS INDEX: 41.95 KG/M2 | HEART RATE: 69 BPM | OXYGEN SATURATION: 94 % | TEMPERATURE: 97.8 F | RESPIRATION RATE: 18 BRPM | SYSTOLIC BLOOD PRESSURE: 115 MMHG | HEIGHT: 70 IN | WEIGHT: 293 LBS

## 2025-02-27 DIAGNOSIS — Z79.01 ANTICOAGULATED: ICD-10-CM

## 2025-02-27 DIAGNOSIS — N93.9 ABNORMAL UTERINE BLEEDING (AUB): Primary | ICD-10-CM

## 2025-02-27 DIAGNOSIS — N94.9 ADNEXAL CYST: ICD-10-CM

## 2025-02-27 DIAGNOSIS — N93.9 ABNORMAL UTERINE BLEEDING: Primary | ICD-10-CM

## 2025-02-27 LAB
ABO + RH BLD: NORMAL
ALBUMIN SERPL-MCNC: 3.5 G/DL (ref 3.5–5)
ALBUMIN/GLOB SERPL: 0.8 (ref 1.1–2.2)
ALP SERPL-CCNC: 87 U/L (ref 45–117)
ALT SERPL-CCNC: 24 U/L (ref 12–78)
ANION GAP SERPL CALC-SCNC: 11 MMOL/L (ref 2–12)
APTT PPP: 23.3 SEC (ref 22.1–31)
AST SERPL-CCNC: 8 U/L (ref 15–37)
BASOPHILS # BLD: 0.07 K/UL (ref 0–0.1)
BASOPHILS NFR BLD: 0.9 % (ref 0–1)
BILIRUB SERPL-MCNC: 0.3 MG/DL (ref 0.2–1)
BLOOD GROUP ANTIBODIES SERPL: NORMAL
BUN SERPL-MCNC: 10 MG/DL (ref 6–20)
BUN/CREAT SERPL: 10 (ref 12–20)
CALCIUM SERPL-MCNC: 8.5 MG/DL (ref 8.5–10.1)
CHLORIDE SERPL-SCNC: 107 MMOL/L (ref 97–108)
CO2 SERPL-SCNC: 24 MMOL/L (ref 21–32)
COMMENT:: NORMAL
CREAT SERPL-MCNC: 1.02 MG/DL (ref 0.55–1.02)
DIFFERENTIAL METHOD BLD: ABNORMAL
EOSINOPHIL # BLD: 0.12 K/UL (ref 0–0.4)
EOSINOPHIL NFR BLD: 1.6 % (ref 0–7)
ERYTHROCYTE [DISTWIDTH] IN BLOOD BY AUTOMATED COUNT: 14.8 % (ref 11.5–14.5)
GLOBULIN SER CALC-MCNC: 4.2 G/DL (ref 2–4)
GLUCOSE SERPL-MCNC: 91 MG/DL (ref 65–100)
HCG, PREGNANCY, URINE, POC: NEGATIVE
HCT VFR BLD AUTO: 43.8 % (ref 35–47)
HEMOGLOBIN, POC: 11.8 G/DL
HGB BLD-MCNC: 14.3 G/DL (ref 11.5–16)
IMM GRANULOCYTES # BLD AUTO: 0.01 K/UL (ref 0–0.04)
IMM GRANULOCYTES NFR BLD AUTO: 0.1 % (ref 0–0.5)
INR PPP: 1 (ref 0.9–1.1)
LYMPHOCYTES # BLD: 3.96 K/UL (ref 0.8–3.5)
LYMPHOCYTES NFR BLD: 53.7 % (ref 12–49)
MCH RBC QN AUTO: 29.9 PG (ref 26–34)
MCHC RBC AUTO-ENTMCNC: 32.6 G/DL (ref 30–36.5)
MCV RBC AUTO: 91.6 FL (ref 80–99)
MONOCYTES # BLD: 0.63 K/UL (ref 0–1)
MONOCYTES NFR BLD: 8.5 % (ref 5–13)
NEUTS SEG # BLD: 2.59 K/UL (ref 1.8–8)
NEUTS SEG NFR BLD: 35.2 % (ref 32–75)
NRBC # BLD: 0 K/UL (ref 0–0.01)
NRBC BLD-RTO: 0 PER 100 WBC
PLATELET # BLD AUTO: 367 K/UL (ref 150–400)
PMV BLD AUTO: 9.6 FL (ref 8.9–12.9)
POTASSIUM SERPL-SCNC: 3.8 MMOL/L (ref 3.5–5.1)
PROT SERPL-MCNC: 7.7 G/DL (ref 6.4–8.2)
PROTHROMBIN TIME: 9.8 SEC (ref 9.2–11.2)
RBC # BLD AUTO: 4.78 M/UL (ref 3.8–5.2)
SODIUM SERPL-SCNC: 142 MMOL/L (ref 136–145)
SPECIMEN EXP DATE BLD: NORMAL
SPECIMEN HOLD: NORMAL
THERAPEUTIC RANGE: NORMAL SECS (ref 58–77)
VALID INTERNAL CONTROL, POC: YES
WBC # BLD AUTO: 7.4 K/UL (ref 3.6–11)

## 2025-02-27 PROCEDURE — 72193 CT PELVIS W/DYE: CPT

## 2025-02-27 PROCEDURE — 96374 THER/PROPH/DIAG INJ IV PUSH: CPT

## 2025-02-27 PROCEDURE — 81025 URINE PREGNANCY TEST: CPT | Performed by: FAMILY MEDICINE

## 2025-02-27 PROCEDURE — 86850 RBC ANTIBODY SCREEN: CPT

## 2025-02-27 PROCEDURE — 99285 EMERGENCY DEPT VISIT HI MDM: CPT

## 2025-02-27 PROCEDURE — 80053 COMPREHEN METABOLIC PANEL: CPT

## 2025-02-27 PROCEDURE — 99214 OFFICE O/P EST MOD 30 MIN: CPT | Performed by: FAMILY MEDICINE

## 2025-02-27 PROCEDURE — 3079F DIAST BP 80-89 MM HG: CPT | Performed by: FAMILY MEDICINE

## 2025-02-27 PROCEDURE — 85025 COMPLETE CBC W/AUTO DIFF WBC: CPT

## 2025-02-27 PROCEDURE — 85018 HEMOGLOBIN: CPT | Performed by: FAMILY MEDICINE

## 2025-02-27 PROCEDURE — 36415 COLL VENOUS BLD VENIPUNCTURE: CPT

## 2025-02-27 PROCEDURE — 85730 THROMBOPLASTIN TIME PARTIAL: CPT

## 2025-02-27 PROCEDURE — 86901 BLOOD TYPING SEROLOGIC RH(D): CPT

## 2025-02-27 PROCEDURE — 6360000004 HC RX CONTRAST MEDICATION: Performed by: EMERGENCY MEDICINE

## 2025-02-27 PROCEDURE — 76830 TRANSVAGINAL US NON-OB: CPT

## 2025-02-27 PROCEDURE — 6370000000 HC RX 637 (ALT 250 FOR IP): Performed by: PHYSICIAN ASSISTANT

## 2025-02-27 PROCEDURE — 3074F SYST BP LT 130 MM HG: CPT | Performed by: FAMILY MEDICINE

## 2025-02-27 PROCEDURE — 86900 BLOOD TYPING SEROLOGIC ABO: CPT

## 2025-02-27 PROCEDURE — 85610 PROTHROMBIN TIME: CPT

## 2025-02-27 PROCEDURE — 6360000002 HC RX W HCPCS: Performed by: PHYSICIAN ASSISTANT

## 2025-02-27 RX ORDER — IOPAMIDOL 755 MG/ML
100 INJECTION, SOLUTION INTRAVASCULAR
Status: COMPLETED | OUTPATIENT
Start: 2025-02-27 | End: 2025-02-27

## 2025-02-27 RX ORDER — ONDANSETRON 2 MG/ML
4 INJECTION INTRAMUSCULAR; INTRAVENOUS
Status: COMPLETED | OUTPATIENT
Start: 2025-02-27 | End: 2025-02-27

## 2025-02-27 RX ORDER — ACETAMINOPHEN 500 MG
1000 TABLET ORAL 3 TIMES DAILY PRN
Qty: 180 TABLET | Refills: 0 | Status: SHIPPED | OUTPATIENT
Start: 2025-02-27

## 2025-02-27 RX ORDER — METHOCARBAMOL 750 MG/1
750 TABLET, FILM COATED ORAL 3 TIMES DAILY
Qty: 15 TABLET | Refills: 0 | Status: SHIPPED | OUTPATIENT
Start: 2025-02-27 | End: 2025-03-04

## 2025-02-27 RX ORDER — METHOCARBAMOL 750 MG/1
750 TABLET, FILM COATED ORAL
Status: COMPLETED | OUTPATIENT
Start: 2025-02-27 | End: 2025-02-27

## 2025-02-27 RX ORDER — ACETAMINOPHEN 500 MG
1000 TABLET ORAL
Status: COMPLETED | OUTPATIENT
Start: 2025-02-27 | End: 2025-02-27

## 2025-02-27 RX ADMIN — ONDANSETRON 4 MG: 2 INJECTION, SOLUTION INTRAMUSCULAR; INTRAVENOUS at 13:32

## 2025-02-27 RX ADMIN — IOPAMIDOL 100 ML: 755 INJECTION, SOLUTION INTRAVENOUS at 13:41

## 2025-02-27 RX ADMIN — METHOCARBAMOL TABLETS 750 MG: 750 TABLET, COATED ORAL at 13:32

## 2025-02-27 RX ADMIN — ACETAMINOPHEN 1000 MG: 500 TABLET ORAL at 13:32

## 2025-02-27 ASSESSMENT — PAIN DESCRIPTION - DESCRIPTORS: DESCRIPTORS: CRAMPING

## 2025-02-27 ASSESSMENT — ENCOUNTER SYMPTOMS
NAUSEA: 1
ABDOMINAL PAIN: 1
BACK PAIN: 1

## 2025-02-27 ASSESSMENT — PAIN DESCRIPTION - LOCATION: LOCATION: PELVIS

## 2025-02-27 ASSESSMENT — PAIN SCALES - GENERAL: PAINLEVEL_OUTOF10: 8

## 2025-02-27 ASSESSMENT — PAIN - FUNCTIONAL ASSESSMENT: PAIN_FUNCTIONAL_ASSESSMENT: 0-10

## 2025-02-27 NOTE — PROGRESS NOTES
Health Decision Maker has been checked with the patient      AI form not signed    Chief Complaint   Patient presents with    Follow-up     Follow up on thyroid and sugars  Just had shots in her back  No cycle in 6 months and recently started bleeding excessively       \"Have you been to the ER, urgent care clinic since your last visit?  Hospitalized since your last visit?\"    NO    “Have you seen or consulted any other health care providers outside of [facility] since your last visit?”    NO      Vitals:    02/27/25 1100   Temp: 97.8 °F (36.6 °C)   TempSrc: Oral   Weight: 135.6 kg (299 lb)      Depression: Not at risk (2/3/2025)    PHQ-2     PHQ-2 Score: 0      Have you had a mammogram?”   NO    No breast cancer screening on file         “Have you had a colorectal cancer screening such as a colonoscopy/FIT/Cologuard?    NO    No colonoscopy on file  No cologuard on file  No FIT/FOBT on file   No flexible sigmoidoscopy on file         Click Here for Release of Records Request    Chart reviewed: immunizations are documented.     There is no immunization history on file for this patient.

## 2025-02-27 NOTE — DISCHARGE INSTRUCTIONS
Call your OB/gyn within 24 hours for close outpatient follow up for abnormal uterine bleeding and to monitor adnexal cyst.  Take medication as prescribed.  Return immediately if any new or worsening symptoms.  Thank you for allowing us to be a part of your care.

## 2025-02-27 NOTE — ED PROVIDER NOTES
[facility]  EMERGENCY DEPARTMENT ENCOUNTER      Pt Name: Shanel Weber  MRN: [id number]  Birthdate 1975  Date of evaluation: 2/27/2025  Provider: Geovanny Durbin PA-C    CHIEF COMPLAINT       Chief Complaint   Patient presents with    Vaginal Bleeding         HISTORY OF PRESENT ILLNESS   (Location/Symptom, Timing/Onset, Context/Setting, Quality, Duration, Modifying Factors, Severity)  Note limiting factors.   50-year-old F presenting to emergency department for heavy abnormal uterine bleeding x 2 days.  Patient has been going through 2-3 tampons per hour for the past 2 days.  She is currently taking Eliquis for DVT of left arm.  Patient has not taken Eliquis today or yesterday due to bleeding.  Reports that she did have epidural steroid injections for chronic lumbar pain with spine specialist 2 days ago.  Patient was seen by PCP who referred her to ED for further evaluation.  Endorses moderate suprapubic abd pain. Endorses minimal nausea. Severe back pain which is her baseline. Denies urinary or bowel dysfunction, saddle paresthesias, loss of function, urinary symptoms.             Review of External Medical Records:     Nursing Notes were reviewed.    REVIEW OF SYSTEMS    (2-9 systems for level 4, 10 or more for level 5)     Review of Systems   Gastrointestinal:  Positive for abdominal pain and nausea.   Genitourinary:  Positive for vaginal bleeding.   Musculoskeletal:  Positive for back pain.   All other systems reviewed and are negative.      Except as noted above the remainder of the review of systems was reviewed and negative.       PAST MEDICAL HISTORY     Past Medical History:   Diagnosis Date    Headache     Osteoarthritis     Primary hypertension 12/9/2024         SURGICAL HISTORY       Past Surgical History:   Procedure Laterality Date    CARDIAC PROCEDURE N/A 12/5/2024    Left heart cath / coronary angiography performed by Elder Roberson MD at [facility] CARDIAC CATH LAB       methocarbamol (ROBAXIN-750) 750 MG tablet Take 1 tablet by mouth 3 times daily for 5 days, Disp-15 tablet, R-0Normal               (Please note that portions of this note were completed with a voice recognition program.  Efforts were made to edit the dictations but occasionally words are mis-transcribed.)    Geovanny Durbin PA-C (electronically signed)  Emergency Attending Physician / Physician Assistant / Nurse Practitioner             Geovanny Durbin PA-C  02/28/25 [id number]

## 2025-02-27 NOTE — PROGRESS NOTES
HPI     Chief Complaint   Patient presents with    Follow-up     Follow up on thyroid and sugars  Just had shots in her back  No cycle in 6 months and recently started bleeding excessively     She is a 50 y.o. female who presents for follow up.    Repeat thyroid levels normal. A1c was normal.     Was having regular periods until September 2024. Until then was having regular periods. Started having hot flashes. She has not had a menstrual cycle in 6 months. She is not on any form of contraception. Stopped taking the Eliquis yesterday and today because of heavy vaginal bleeding since Tuesday. States the last 3 days she has been bleeding through a pad or tampon in 30 min to 1 hour. When she uses the bathroom the toilet is red she states. No blood in her stool but has not had a BM recently. She is having cramps with the bleeding. She states she has had a BTL and does not think she could be pregnant.     Has only been taking Eliquis, Gabapentin for back pain. Has held her last 2 doses of Eliquis.     Reviewed PmHx, RxHx, FmHx, SocHx, AllgHx and updated and dated in the chart.    Physical Exam:  /80   Pulse 69   Temp 97.8 °F (36.6 °C) (Oral)   Resp 18   Ht 1.778 m (5' 10\")   Wt 135.6 kg (299 lb)   SpO2 94%   BMI 42.90 kg/m²   Physical Exam  Vitals and nursing note reviewed.   Constitutional:       General: She is not in acute distress.     Appearance: Normal appearance. She is not ill-appearing.   Cardiovascular:      Rate and Rhythm: Normal rate and regular rhythm.      Heart sounds: No murmur heard.  Pulmonary:      Effort: Pulmonary effort is normal. No respiratory distress.      Breath sounds: Normal breath sounds. No wheezing, rhonchi or rales.   Abdominal:      General: There is no distension.      Palpations: Abdomen is soft.      Tenderness: There is abdominal tenderness. There is no guarding or rebound.      Comments: She has tenderness in lower abdomen to palpation but not an acute abdomen.

## (undated) DEVICE — KIT MED IMAG CNTRST AGNT W/ IOPAMIDOL REUSE

## (undated) DEVICE — PADPRO DEFIBRILLATION/PACING/CARDIOVERSION/MONITORING ELECTRODES, ADULT/CHILD GREATER THAN 10 KG RADIOTRANSPARENT ELECTRODE, PHYSIO-CONTROL QUIK-COMBO (M) 60" (152 CM): Brand: PADPRO

## (undated) DEVICE — KIT HND CTRL 3 W STPCOCK ROT END 54IN PREM HI PRSS TBNG AT

## (undated) DEVICE — CATHETER DIAG 5FR L100CM LUMN ID0.047IN JL3.5 CRV 0 SIDE H

## (undated) DEVICE — SPLINT WR VELC FOAM NEUT POS DISP FOR RAD ART ACC SFT STRP

## (undated) DEVICE — CATHETER DIAG 5FR L100CM LUMN ID0.047IN JR4 CRV 0 SIDE H

## (undated) DEVICE — WASTE KIT - ST MARY: Brand: MEDLINE INDUSTRIES, INC.

## (undated) DEVICE — Device

## (undated) DEVICE — GLIDESHEATH SLENDER ACCESS KIT: Brand: GLIDESHEATH SLENDER

## (undated) DEVICE — KIT MFLD ISOLATN NACL CNTRST PRT TBNG SPIK W/ PRSS TRNSDUC

## (undated) DEVICE — TR BAND RADIAL ARTERY COMPRESSION DEVICE: Brand: TR BAND

## (undated) DEVICE — ANGIOGRAPHY KIT

## (undated) DEVICE — PROVE COVER: Brand: UNBRANDED

## (undated) DEVICE — SPECIAL PROCEDURE DRAPE 32" X 34": Brand: SPECIAL PROCEDURE DRAPE

## (undated) DEVICE — GUIDEWIRE VASC J 3 MM 0.035 INX210 CM FIX COR INQWIRE

## (undated) DEVICE — KIT AT-X65 ANGIOTOUCH HAND CONTROLLER